# Patient Record
Sex: FEMALE | Race: WHITE | NOT HISPANIC OR LATINO | Employment: FULL TIME | URBAN - METROPOLITAN AREA
[De-identification: names, ages, dates, MRNs, and addresses within clinical notes are randomized per-mention and may not be internally consistent; named-entity substitution may affect disease eponyms.]

---

## 2017-02-27 ENCOUNTER — APPOINTMENT (OUTPATIENT)
Dept: MAMMOGRAPHY | Facility: CLINIC | Age: 47
End: 2017-02-27

## 2017-02-27 ENCOUNTER — OFFICE VISIT (OUTPATIENT)
Dept: OBSTETRICS AND GYNECOLOGY | Facility: CLINIC | Age: 47
End: 2017-02-27

## 2017-02-27 VITALS
WEIGHT: 166.6 LBS | BODY MASS INDEX: 28.44 KG/M2 | DIASTOLIC BLOOD PRESSURE: 66 MMHG | HEIGHT: 64 IN | SYSTOLIC BLOOD PRESSURE: 108 MMHG

## 2017-02-27 DIAGNOSIS — Z01.419 ENCOUNTER FOR GYNECOLOGICAL EXAMINATION WITHOUT ABNORMAL FINDING: Primary | ICD-10-CM

## 2017-02-27 DIAGNOSIS — Z12.31 VISIT FOR SCREENING MAMMOGRAM: ICD-10-CM

## 2017-02-27 PROCEDURE — 99396 PREV VISIT EST AGE 40-64: CPT | Performed by: OBSTETRICS & GYNECOLOGY

## 2017-02-27 PROCEDURE — 77067 SCR MAMMO BI INCL CAD: CPT | Performed by: OBSTETRICS & GYNECOLOGY

## 2017-02-27 NOTE — PROGRESS NOTES
Subjective   CC AE    Chief Complaint   Patient presents with   • Gynecologic Exam      History of Present Illness    Inez Olivares is a 47 y.o. female who presents for annual exam.  Patient had regular periods and just lost her  6 months ago.  Mammogram today.  Negative family history.    Obstetric History:  OB History      Para Term  AB TAB SAB Ectopic Multiple Living    3 3 3                Menstrual History:     No LMP recorded.       Past Medical History   Diagnosis Date   • Asthma      ALLERGY RELATED   • PONV (postoperative nausea and vomiting)      Family History   Problem Relation Age of Onset   • Uterine cancer Mother        The following portions of the patient's history were reviewed and updated as appropriate: allergies, current medications, past family history, past medical history, past social history, past surgical history and problem list.    Review of Systems   Constitutional: Negative.  Negative for fever and unexpected weight change.   HENT: Negative.    Respiratory: Negative for shortness of breath and wheezing.    Cardiovascular: Negative for chest pain, palpitations and leg swelling.   Gastrointestinal: Negative for abdominal pain, anal bleeding and blood in stool.   Genitourinary: Negative for dysuria, pelvic pain, urgency, vaginal bleeding, vaginal discharge and vaginal pain.   Skin: Negative.    Neurological: Negative.    Hematological: Negative.  Negative for adenopathy.   Psychiatric/Behavioral: Negative.  Negative for dysphoric mood. The patient is not nervous/anxious.             Objective   Physical Exam   Constitutional: She is oriented to person, place, and time. Vital signs are normal. She appears well-developed and well-nourished.   HENT:   Head: Normocephalic.   Neck: Trachea normal. No tracheal deviation present. No thyromegaly present.   Cardiovascular: Normal rate, regular rhythm and normal heart sounds.    No murmur heard.  Pulmonary/Chest: Effort normal  "and breath sounds normal.   Breasts without masses, tenderness or nipple discharge   Abdominal: Soft. Normal appearance. She exhibits no mass. There is no hepatosplenomegaly. There is no tenderness. No hernia.   Genitourinary: Rectum normal, vagina normal and uterus normal. Uterus is not enlarged and not tender. Cervix exhibits no motion tenderness. Right adnexum displays no mass and no tenderness. Left adnexum displays no mass and no tenderness. No vaginal discharge found.   Genitourinary Comments: External genitalia normal    Lymphadenopathy:     She has no cervical adenopathy.     She has no axillary adenopathy.   Neurological: She is alert and oriented to person, place, and time.   Skin: Skin is warm and dry. No rash noted.   Psychiatric: She has a normal mood and affect. Her behavior is normal. Cognition and memory are normal.       Visit Vitals   • /66   • Ht 64\" (162.6 cm)   • Wt 166 lb 9.6 oz (75.6 kg)   • BMI 28.6 kg/m2       Assessment/Plan   Inez was seen today for gynecologic exam.    Diagnoses and all orders for this visit:    Encounter for gynecological examination without abnormal finding  -     IGP,rfx Aptima HPV All Pth      Mammogram. RTO 1 year             "

## 2017-03-01 LAB
CONV .: NORMAL
CYTOLOGIST CVX/VAG CYTO: NORMAL
CYTOLOGY CVX/VAG DOC THIN PREP: NORMAL
DX ICD CODE: NORMAL
HIV 1 & 2 AB SER-IMP: NORMAL
OTHER STN SPEC: NORMAL
PATH REPORT.FINAL DX SPEC: NORMAL
STAT OF ADQ CVX/VAG CYTO-IMP: NORMAL

## 2017-03-27 ENCOUNTER — OFFICE VISIT (OUTPATIENT)
Dept: RETAIL CLINIC | Facility: CLINIC | Age: 47
End: 2017-03-27

## 2017-03-27 VITALS — TEMPERATURE: 98.3 F | HEART RATE: 75 BPM | OXYGEN SATURATION: 98 %

## 2017-03-27 DIAGNOSIS — R30.0 DYSURIA: Primary | ICD-10-CM

## 2017-03-27 DIAGNOSIS — N30.00 ACUTE CYSTITIS WITHOUT HEMATURIA: ICD-10-CM

## 2017-03-27 LAB
BILIRUB BLD-MCNC: NEGATIVE MG/DL
CLARITY, POC: CLEAR
COLOR UR: ABNORMAL
GLUCOSE UR STRIP-MCNC: NEGATIVE MG/DL
KETONES UR QL: NEGATIVE
LEUKOCYTE EST, POC: ABNORMAL
NITRITE UR-MCNC: NEGATIVE MG/ML
PH UR: 6.5 [PH] (ref 5–8)
PROT UR STRIP-MCNC: NEGATIVE MG/DL
RBC # UR STRIP: ABNORMAL /UL
SP GR UR: 1.01 (ref 1–1.03)
UROBILINOGEN UR QL: NORMAL

## 2017-03-27 PROCEDURE — 81002 URINALYSIS NONAUTO W/O SCOPE: CPT | Performed by: NURSE PRACTITIONER

## 2017-03-27 PROCEDURE — 99213 OFFICE O/P EST LOW 20 MIN: CPT | Performed by: NURSE PRACTITIONER

## 2017-03-27 RX ORDER — CIPROFLOXACIN 500 MG/1
500 TABLET, FILM COATED ORAL DAILY
Qty: 3 TABLET | Refills: 0 | Status: SHIPPED | OUTPATIENT
Start: 2017-03-27 | End: 2018-03-05

## 2017-03-27 NOTE — PROGRESS NOTES
Subjective   Patient ID: Inez Olivares is a 47 y.o. female presents with   Chief Complaint   Patient presents with   • Urinary Tract Infection     1 week       HPI Comments: 48 yo wf  PMH: S/P tubal ligation 3/2016  Cc: urgency, frequency and burning upon urination x 1 week.  She has taken nothing for relief.     Urinary Tract Infection    Associated symptoms include frequency and urgency. Pertinent negatives include no chills, flank pain, hematuria, nausea or vomiting.       No Known Allergies    The following portions of the patient's history were reviewed and updated as appropriate: allergies, current medications, past family history, past medical history, past social history, past surgical history and problem list.      Review of Systems   Constitutional: Negative for appetite change, chills, fever and unexpected weight change.   HENT: Negative.    Eyes: Negative.    Respiratory: Negative for chest tightness, shortness of breath and wheezing.    Cardiovascular: Negative for chest pain and palpitations.   Gastrointestinal: Negative for abdominal distention, abdominal pain, blood in stool, diarrhea, nausea and vomiting.   Endocrine: Negative.    Genitourinary: Positive for dysuria, frequency and urgency. Negative for difficulty urinating, enuresis, flank pain, genital sores, hematuria, menstrual problem, pelvic pain, vaginal bleeding, vaginal discharge and vaginal pain.   Musculoskeletal: Negative for arthralgias and joint swelling.   Skin: Negative for color change and rash.   Allergic/Immunologic: Negative.    Neurological: Negative for syncope, weakness and light-headedness.   Hematological: Negative for adenopathy. Does not bruise/bleed easily.   Psychiatric/Behavioral: Negative for confusion and sleep disturbance.       Objective     Vitals:    03/27/17 1619   Pulse: 75   Temp: 98.3 °F (36.8 °C)   SpO2: 98%         Physical Exam   Constitutional: She is oriented to person, place, and time. She appears  well-developed and well-nourished. No distress.   HENT:   Head: Normocephalic and atraumatic.   Eyes: Conjunctivae and EOM are normal.   Neck: Neck supple.   Cardiovascular: Normal rate, regular rhythm and normal heart sounds.    Pulmonary/Chest: Effort normal and breath sounds normal.   Abdominal: Soft. Bowel sounds are normal. She exhibits no distension and no mass. There is no tenderness. There is no rebound and no guarding.   Musculoskeletal: Normal range of motion.   Neurological: She is alert and oriented to person, place, and time. She has normal reflexes.   Skin: Skin is warm and dry. No rash noted. She is not diaphoretic.   Psychiatric: She has a normal mood and affect. Her behavior is normal. Judgment and thought content normal.   Nursing note and vitals reviewed.    Results for orders placed or performed in visit on 03/27/17   POC Urinalysis Dipstick   Result Value Ref Range    Color Straw Yellow, Straw, Dark Yellow, Radha    Clarity, UA Clear Clear    Glucose, UA Negative Negative, 1000 mg/dL (3+) mg/dL    Bilirubin Negative Negative    Ketones, UA Negative Negative    Specific Gravity  1.010 1.005 - 1.030    Blood, UA Small (A) Negative    pH, Urine 6.5 5.0 - 8.0    Protein, POC Negative Negative mg/dL    Urobilinogen, UA Normal Normal    Leukocytes Small (1+) (A) Negative    Nitrite, UA Negative Negative           Inez was seen today for urinary tract infection.    Diagnoses and all orders for this visit:    Dysuria  -     POC Urinalysis Dipstick  -     Urine Culture    Acute cystitis without hematuria  -     ciprofloxacin (CIPRO) 500 MG tablet; Take 1 tablet by mouth Daily.    Increase fluids, rest, activity as tolerated, Tylenol or Advil OTC as needed for pain  I will inform patient of pending results as soon as they are available.      Follow-up with Primary Care Physician in 24-48 hours if these symptoms worsen or fail to improve as anticipated.

## 2017-03-31 ENCOUNTER — DOCUMENTATION (OUTPATIENT)
Dept: RETAIL CLINIC | Facility: CLINIC | Age: 47
End: 2017-03-31

## 2017-03-31 DIAGNOSIS — N30.00 ACUTE CYSTITIS WITHOUT HEMATURIA: Primary | ICD-10-CM

## 2017-03-31 LAB
BACTERIA UR CULT: ABNORMAL
BACTERIA UR CULT: ABNORMAL
Lab: NORMAL
ONE SPECIMEN IDENTIFIER: NORMAL
OTHER ANTIBIOTIC SUSC ISLT: ABNORMAL

## 2017-03-31 RX ORDER — NITROFURANTOIN 25; 75 MG/1; MG/1
100 CAPSULE ORAL 2 TIMES DAILY
Qty: 20 CAPSULE | Refills: 0 | Status: SHIPPED | OUTPATIENT
Start: 2017-03-31 | End: 2018-03-05

## 2017-03-31 NOTE — PROGRESS NOTES
Urine culture results discussed with patient. +Ecoli. Resistant to cipro. Will send rx macrobid 100mg po bid x 7d. Pt reports symptomatic improvement.

## 2017-10-25 ENCOUNTER — TRANSCRIBE ORDERS (OUTPATIENT)
Dept: PHYSICAL THERAPY | Facility: HOSPITAL | Age: 47
End: 2017-10-25

## 2017-10-25 DIAGNOSIS — M25.551 PAIN OF RIGHT HIP JOINT: Primary | ICD-10-CM

## 2017-11-02 ENCOUNTER — APPOINTMENT (OUTPATIENT)
Dept: PHYSICAL THERAPY | Facility: HOSPITAL | Age: 47
End: 2017-11-02

## 2018-03-05 ENCOUNTER — TRANSCRIBE ORDERS (OUTPATIENT)
Dept: OBSTETRICS AND GYNECOLOGY | Facility: CLINIC | Age: 48
End: 2018-03-05

## 2018-03-05 ENCOUNTER — OFFICE VISIT (OUTPATIENT)
Dept: OBSTETRICS AND GYNECOLOGY | Facility: CLINIC | Age: 48
End: 2018-03-05

## 2018-03-05 VITALS
HEIGHT: 64 IN | DIASTOLIC BLOOD PRESSURE: 68 MMHG | WEIGHT: 170.2 LBS | BODY MASS INDEX: 29.06 KG/M2 | SYSTOLIC BLOOD PRESSURE: 120 MMHG

## 2018-03-05 DIAGNOSIS — Z01.419 ENCOUNTER FOR GYNECOLOGICAL EXAMINATION WITHOUT ABNORMAL FINDING: Primary | ICD-10-CM

## 2018-03-05 DIAGNOSIS — Z12.31 VISIT FOR SCREENING MAMMOGRAM: Primary | ICD-10-CM

## 2018-03-05 DIAGNOSIS — R39.9 LOWER URINARY TRACT SYMPTOMS: ICD-10-CM

## 2018-03-05 PROCEDURE — 99396 PREV VISIT EST AGE 40-64: CPT | Performed by: OBSTETRICS & GYNECOLOGY

## 2018-03-05 NOTE — PROGRESS NOTES
Subjective    Chief Complaint   Patient presents with   • Gynecologic Exam   • Vaginitis   • Urinary Tract Infection      History of Present Illness    Inez Olivares is a 48 y.o. female who presents for annual exam. Mammogram is being rescheduled.  Nonsmoker.  No problems.  Patient lost her  little over a year ago.    Obstetric History:  OB History      Para Term  AB Living    3 3 3       SAB TAB Ectopic Multiple Live Births                 Menstrual History:     No LMP recorded.       Past Medical History:   Diagnosis Date   • Asthma     ALLERGY RELATED   • PONV (postoperative nausea and vomiting)      Family History   Problem Relation Age of Onset   • Uterine cancer Mother        The following portions of the patient's history were reviewed and updated as appropriate: allergies, current medications, past family history, past medical history, past social history, past surgical history and problem list.    Review of Systems   Constitutional: Negative.  Negative for fever and unexpected weight change.   HENT: Negative.    Respiratory: Negative for shortness of breath and wheezing.    Cardiovascular: Negative for chest pain, palpitations and leg swelling.   Gastrointestinal: Negative for abdominal pain, anal bleeding and blood in stool.   Genitourinary: Positive for dysuria. Negative for pelvic pain, urgency, vaginal bleeding, vaginal discharge and vaginal pain.   Skin: Negative.    Neurological: Negative.    Hematological: Negative.  Negative for adenopathy.   Psychiatric/Behavioral: Negative.  Negative for dysphoric mood. The patient is not nervous/anxious.             Objective   Physical Exam   Constitutional: She is oriented to person, place, and time. Vital signs are normal. She appears well-developed and well-nourished.   HENT:   Head: Normocephalic.   Neck: Trachea normal. No tracheal deviation present. No thyromegaly present.   Cardiovascular: Normal rate, regular rhythm and normal  "heart sounds.    No murmur heard.  Pulmonary/Chest: Effort normal and breath sounds normal.   Breasts without masses, tenderness or nipple discharge   Abdominal: Soft. Normal appearance. She exhibits no mass. There is no hepatosplenomegaly. There is no tenderness. No hernia.   Genitourinary: Rectum normal, vagina normal and uterus normal. Uterus is not enlarged and not tender. Cervix exhibits no motion tenderness. Right adnexum displays no mass and no tenderness. Left adnexum displays no mass and no tenderness. No vaginal discharge found.   Genitourinary Comments: External genitalia normal    Lymphadenopathy:     She has no cervical adenopathy.     She has no axillary adenopathy.   Neurological: She is alert and oriented to person, place, and time.   Skin: Skin is warm and dry. No rash noted.   Psychiatric: She has a normal mood and affect. Her behavior is normal. Cognition and memory are normal.       /68  Ht 162.6 cm (64\")  Wt 77.2 kg (170 lb 3.2 oz)  BMI 29.21 kg/m2    Assessment/Plan   Inez was seen today for gynecologic exam, vaginitis and urinary tract infection.    Diagnoses and all orders for this visit:    Encounter for gynecological examination without abnormal finding  -     IGP,rfx Aptima HPV All Pth    Lower urinary tract symptoms  -     Urine Culture - Urine, Urine, Clean Catch  -     Urinalysis With Microscopic - Urine, Clean Catch      Mammogram. RTO 1 year     Counseled about beginning calcium with vitamin D.  Also discussed exercise and weight loss due to dieting.         "

## 2018-03-07 ENCOUNTER — TELEPHONE (OUTPATIENT)
Dept: OBSTETRICS AND GYNECOLOGY | Facility: CLINIC | Age: 48
End: 2018-03-07

## 2018-03-07 LAB
APPEARANCE UR: CLEAR
BACTERIA #/AREA URNS HPF: NORMAL /HPF
BACTERIA UR CULT: NORMAL
BACTERIA UR CULT: NORMAL
BILIRUB UR QL STRIP: NEGATIVE
CASTS URNS MICRO: NORMAL
COLOR UR: YELLOW
EPI CELLS #/AREA URNS HPF: NORMAL /HPF
GLUCOSE UR QL: NEGATIVE
HGB UR QL STRIP: NEGATIVE
KETONES UR QL STRIP: NEGATIVE
LEUKOCYTE ESTERASE UR QL STRIP: NEGATIVE
NITRITE UR QL STRIP: NEGATIVE
PH UR STRIP: 6 [PH] (ref 5–8)
PROT UR QL STRIP: NEGATIVE
RBC #/AREA URNS HPF: NORMAL /HPF
SP GR UR: 1.02 (ref 1–1.03)
UROBILINOGEN UR STRIP-MCNC: (no result) MG/DL
WBC #/AREA URNS HPF: NORMAL /HPF

## 2018-03-07 NOTE — TELEPHONE ENCOUNTER
Discussed normal urine culture results.  Also referred patient to Dr. Nehemiah Cleaning dermatologist at [facility].  MACKENZIE

## 2018-03-09 LAB
CONV .: ABNORMAL
CYTOLOGIST CVX/VAG CYTO: ABNORMAL
CYTOLOGY CVX/VAG DOC THIN PREP: ABNORMAL
DX ICD CODE: ABNORMAL
DX ICD CODE: ABNORMAL
HIV 1 & 2 AB SER-IMP: ABNORMAL
HPV I/H RISK 4 DNA CVX QL PROBE+SIG AMP: NEGATIVE
OTHER STN SPEC: ABNORMAL
PATH REPORT.FINAL DX SPEC: ABNORMAL
PATHOLOGIST CVX/VAG CYTO: ABNORMAL
STAT OF ADQ CVX/VAG CYTO-IMP: ABNORMAL

## 2018-03-12 DIAGNOSIS — L98.9 SKIN LESION: Primary | ICD-10-CM

## 2018-03-28 ENCOUNTER — OFFICE VISIT (OUTPATIENT)
Dept: RETAIL CLINIC | Facility: CLINIC | Age: 48
End: 2018-03-28

## 2018-03-28 VITALS
RESPIRATION RATE: 18 BRPM | DIASTOLIC BLOOD PRESSURE: 78 MMHG | SYSTOLIC BLOOD PRESSURE: 122 MMHG | OXYGEN SATURATION: 99 % | TEMPERATURE: 98.5 F | HEART RATE: 82 BPM

## 2018-03-28 DIAGNOSIS — J06.9 ACUTE URI: ICD-10-CM

## 2018-03-28 DIAGNOSIS — N39.0 URINARY TRACT INFECTION WITH HEMATURIA, SITE UNSPECIFIED: Primary | ICD-10-CM

## 2018-03-28 DIAGNOSIS — R31.9 URINARY TRACT INFECTION WITH HEMATURIA, SITE UNSPECIFIED: Primary | ICD-10-CM

## 2018-03-28 LAB
BILIRUB BLD-MCNC: NEGATIVE MG/DL
CLARITY, POC: CLEAR
COLOR UR: YELLOW
GLUCOSE UR STRIP-MCNC: NEGATIVE MG/DL
KETONES UR QL: NEGATIVE
LEUKOCYTE EST, POC: ABNORMAL
NITRITE UR-MCNC: NEGATIVE MG/ML
PH UR: 7 [PH] (ref 5–8)
PROT UR STRIP-MCNC: NEGATIVE MG/DL
RBC # UR STRIP: ABNORMAL /UL
SP GR UR: 1.01 (ref 1–1.03)
UROBILINOGEN UR QL: ABNORMAL

## 2018-03-28 PROCEDURE — 99213 OFFICE O/P EST LOW 20 MIN: CPT | Performed by: NURSE PRACTITIONER

## 2018-03-28 PROCEDURE — 81003 URINALYSIS AUTO W/O SCOPE: CPT | Performed by: NURSE PRACTITIONER

## 2018-03-28 RX ORDER — AMOXICILLIN 500 MG/1
500 CAPSULE ORAL 2 TIMES DAILY
Qty: 20 CAPSULE | Refills: 0 | Status: SHIPPED | OUTPATIENT
Start: 2018-03-28 | End: 2018-04-07

## 2018-03-28 RX ORDER — PREDNISONE 20 MG/1
20 TABLET ORAL 2 TIMES DAILY
Qty: 14 TABLET | Refills: 0 | Status: SHIPPED | OUTPATIENT
Start: 2018-03-28 | End: 2018-03-28

## 2018-03-28 RX ORDER — BROMPHENIRAMINE MALEATE, PSEUDOEPHEDRINE HYDROCHLORIDE, AND DEXTROMETHORPHAN HYDROBROMIDE 2; 30; 10 MG/5ML; MG/5ML; MG/5ML
SYRUP ORAL
Qty: 240 ML | Refills: 0 | Status: SHIPPED | OUTPATIENT
Start: 2018-03-28 | End: 2018-11-20

## 2018-03-28 RX ORDER — AMOXICILLIN 500 MG/1
500 CAPSULE ORAL 2 TIMES DAILY
Qty: 20 CAPSULE | Refills: 0 | Status: SHIPPED | OUTPATIENT
Start: 2018-03-28 | End: 2018-03-28

## 2018-03-28 RX ORDER — PREDNISONE 20 MG/1
20 TABLET ORAL 2 TIMES DAILY
Qty: 14 TABLET | Refills: 0 | Status: SHIPPED | OUTPATIENT
Start: 2018-03-28 | End: 2018-04-04

## 2018-03-28 RX ORDER — BROMPHENIRAMINE MALEATE, PSEUDOEPHEDRINE HYDROCHLORIDE, AND DEXTROMETHORPHAN HYDROBROMIDE 2; 30; 10 MG/5ML; MG/5ML; MG/5ML
SYRUP ORAL
Qty: 240 ML | Refills: 0 | Status: SHIPPED | OUTPATIENT
Start: 2018-03-28 | End: 2018-03-28

## 2018-03-28 NOTE — PATIENT INSTRUCTIONS
Sinusitis, Adult  Sinusitis is soreness and inflammation of your sinuses. Sinuses are hollow spaces in the bones around your face. Your sinuses are located:  · Around your eyes.  · In the middle of your forehead.  · Behind your nose.  · In your cheekbones.  Your sinuses and nasal passages are lined with a stringy fluid (mucus). Mucus normally drains out of your sinuses. When your nasal tissues become inflamed or swollen, the mucus can become trapped or blocked so air cannot flow through your sinuses. This allows bacteria, viruses, and funguses to grow, which leads to infection.  Sinusitis can develop quickly and last for 7?10 days (acute) or for more than 12 weeks (chronic). Sinusitis often develops after a cold.  What are the causes?  This condition is caused by anything that creates swelling in the sinuses or stops mucus from draining, including:  · Allergies.  · Asthma.  · Bacterial or viral infection.  · Abnormally shaped bones between the nasal passages.  · Nasal growths that contain mucus (nasal polyps).  · Narrow sinus openings.  · Pollutants, such as chemicals or irritants in the air.  · A foreign object stuck in the nose.  · A fungal infection. This is rare.  What increases the risk?  The following factors may make you more likely to develop this condition:  · Having allergies or asthma.  · Having had a recent cold or respiratory tract infection.  · Having structural deformities or blockages in your nose or sinuses.  · Having a weak immune system.  · Doing a lot of swimming or diving.  · Overusing nasal sprays.  · Smoking.  What are the signs or symptoms?  The main symptoms of this condition are pain and a feeling of pressure around the affected sinuses. Other symptoms include:  · Upper toothache.  · Earache.  · Headache.  · Bad breath.  · Decreased sense of smell and taste.  · A cough that may get worse at night.  · Fatigue.  · Fever.  · Thick drainage from your nose. The drainage is often green and it may  contain pus (purulent).  · Stuffy nose or congestion.  · Postnasal drip. This is when extra mucus collects in the throat or back of the nose.  · Swelling and warmth over the affected sinuses.  · Sore throat.  · Sensitivity to light.  How is this diagnosed?  This condition is diagnosed based on symptoms, a medical history, and a physical exam. To find out if your condition is acute or chronic, your health care provider may:  · Look in your nose for signs of nasal polyps.  · Tap over the affected sinus to check for signs of infection.  · View the inside of your sinuses using an imaging device that has a light attached (endoscope).  If your health care provider suspects that you have chronic sinusitis, you may also:  · Be tested for allergies.  · Have a sample of mucus taken from your nose (nasal culture) and checked for bacteria.  · Have a mucus sample examined to see if your sinusitis is related to an allergy.  If your sinusitis does not respond to treatment and it lasts longer than 8 weeks, you may have an MRI or CT scan to check your sinuses. These scans also help to determine how severe your infection is.  In rare cases, a bone biopsy may be done to rule out more serious types of fungal sinus disease.  How is this treated?  Treatment for sinusitis depends on the cause and whether your condition is chronic or acute. If a virus is causing your sinusitis, your symptoms will go away on their own within 10 days. You may be given medicines to relieve your symptoms, including:  · Topical nasal decongestants. They shrink swollen nasal passages and let mucus drain from your sinuses.  · Antihistamines. These drugs block inflammation that is triggered by allergies. This can help to ease swelling in your nose and sinuses.  · Topical nasal corticosteroids. These are nasal sprays that ease inflammation and swelling in your nose and sinuses.  · Nasal saline washes. These rinses can help to get rid of thick mucus in your  nose.  If your condition is caused by bacteria, you will be given an antibiotic medicine. If your condition is caused by a fungus, you will be given an antifungal medicine.  Surgery may be needed to correct underlying conditions, such as narrow nasal passages. Surgery may also be needed to remove polyps.  Follow these instructions at home:  Medicines   · Take, use, or apply over-the-counter and prescription medicines only as told by your health care provider. These may include nasal sprays.  · If you were prescribed an antibiotic medicine, take it as told by your health care provider. Do not stop taking the antibiotic even if you start to feel better.  Hydrate and Humidify   · Drink enough water to keep your urine clear or pale yellow. Staying hydrated will help to thin your mucus.  · Use a cool mist humidifier to keep the humidity level in your home above 50%.  · Inhale steam for 10-15 minutes, 3-4 times a day or as told by your health care provider. You can do this in the bathroom while a hot shower is running.  · Limit your exposure to cool or dry air.  Rest   · Rest as much as possible.  · Sleep with your head raised (elevated).  · Make sure to get enough sleep each night.  General instructions   · Apply a warm, moist washcloth to your face 3-4 times a day or as told by your health care provider. This will help with discomfort.  · Wash your hands often with soap and water to reduce your exposure to viruses and other germs. If soap and water are not available, use hand .  · Do not smoke. Avoid being around people who are smoking (secondhand smoke).  · Keep all follow-up visits as told by your health care provider. This is important.  Contact a health care provider if:  · You have a fever.  · Your symptoms get worse.  · Your symptoms do not improve within 10 days.  Get help right away if:  · You have a severe headache.  · You have persistent vomiting.  · You have pain or swelling around your face or  eyes.  · You have vision problems.  · You develop confusion.  · Your neck is stiff.  · You have trouble breathing.  This information is not intended to replace advice given to you by your health care provider. Make sure you discuss any questions you have with your health care provider.  Document Released: 12/18/2006 Document Revised: 08/13/2017 Document Reviewed: 10/12/2016  [employer] Interactive Patient Education © 2017 [employer]  Urinary Tract Infection, Adult  A urinary tract infection (UTI) is an infection of any part of the urinary tract, which includes the kidneys, ureters, bladder, and urethra. These organs make, store, and get rid of urine in the body. UTI can be a bladder infection (cystitis) or kidney infection (pyelonephritis).  What are the causes?  This infection may be caused by fungi, viruses, or bacteria. Bacteria are the most common cause of UTIs. This condition can also be caused by repeated incomplete emptying of the bladder during urination.  What increases the risk?  This condition is more likely to develop if:  · You ignore your need to urinate or hold urine for long periods of time.  · You do not empty your bladder completely during urination.  · You wipe back to front after urinating or having a bowel movement, if you are female.  · You are uncircumcised, if you are male.  · You are constipated.  · You have a urinary catheter that stays in place (indwelling).  · You have a weak defense (immune) system.  · You have a medical condition that affects your bowels, kidneys, or bladder.  · You have diabetes.  · You take antibiotic medicines frequently or for long periods of time, and the antibiotics no longer work well against certain types of infections (antibiotic resistance).  · You take medicines that irritate your urinary tract.  · You are exposed to chemicals that irritate your urinary tract.  · You are female.  What are the signs or symptoms?  Symptoms of this condition  include:  · Fever.  · Frequent urination or passing small amounts of urine frequently.  · Needing to urinate urgently.  · Pain or burning with urination.  · Urine that smells bad or unusual.  · Cloudy urine.  · Pain in the lower abdomen or back.  · Trouble urinating.  · Blood in the urine.  · Vomiting or being less hungry than normal.  · Diarrhea or abdominal pain.  · Vaginal discharge, if you are female.  How is this diagnosed?  This condition is diagnosed with a medical history and physical exam. You will also need to provide a urine sample to test your urine. Other tests may be done, including:  · Blood tests.  · Sexually transmitted disease (STD) testing.  If you have had more than one UTI, a cystoscopy or imaging studies may be done to determine the cause of the infections.  How is this treated?  Treatment for this condition often includes a combination of two or more of the following:  · Antibiotic medicine.  · Other medicines to treat less common causes of UTI.  · Over-the-counter medicines to treat pain.  · Drinking enough water to stay hydrated.  Follow these instructions at home:  · Take over-the-counter and prescription medicines only as told by your health care provider.  · If you were prescribed an antibiotic, take it as told by your health care provider. Do not stop taking the antibiotic even if you start to feel better.  · Avoid alcohol, caffeine, tea, and carbonated beverages. They can irritate your bladder.  · Drink enough fluid to keep your urine clear or pale yellow.  · Keep all follow-up visits as told by your health care provider. This is important.  · Make sure to:  ¨ Empty your bladder often and completely. Do not hold urine for long periods of time.  ¨ Empty your bladder before and after sex.  ¨ Wipe from front to back after a bowel movement if you are female. Use each tissue one time when you wipe.  Contact a health care provider if:  · You have back pain.  · You have a fever.  · You feel  nauseous or vomit.  · Your symptoms do not get better after 3 days.  · Your symptoms go away and then return.  Get help right away if:  · You have severe back pain or lower abdominal pain.  · You are vomiting and cannot keep down any medicines or water.  This information is not intended to replace advice given to you by your health care provider. Make sure you discuss any questions you have with your health care provider.  Document Released: 09/27/2006 Document Revised: 05/31/2017 Document Reviewed: 11/07/2016  [employer] Interactive Patient Education © 2017 [employer]

## 2018-03-28 NOTE — PROGRESS NOTES
Subjective:     Inez Olivares is a 48 y.o.     Urinary Tract Infection    This is a new problem. The current episode started yesterday. The quality of the pain is described as burning. There has been no fever. Associated symptoms include frequency, hematuria, nausea and urgency. Pertinent negatives include no flank pain or vomiting. She has tried nothing for the symptoms. Her past medical history is significant for recurrent UTIs.   Sinusitis   This is a new problem. The current episode started in the past 7 days. The problem has been gradually worsening since onset. There has been no fever. Associated symptoms include congestion (yellow), coughing, headaches and sinus pressure. Pertinent negatives include no ear pain, shortness of breath or sore throat. Treatments tried: benadryl. The treatment provided no relief.         The following portions of the patient's history were reviewed and updated as appropriate: allergies, current medications, past family history, past medical history, past social history, past surgical history and problem list.      Review of Systems   Constitutional: Positive for fatigue. Negative for appetite change and fever.   HENT: Positive for congestion (yellow) and sinus pressure. Negative for ear pain and sore throat.    Respiratory: Positive for cough. Negative for shortness of breath and wheezing.    Cardiovascular: Negative.    Gastrointestinal: Positive for nausea. Negative for diarrhea and vomiting.   Genitourinary: Positive for frequency, hematuria and urgency. Negative for flank pain.   Musculoskeletal: Negative for myalgias.   Skin: Negative for color change, pallor and rash.   Neurological: Positive for headaches. Negative for dizziness.         Objective:      Physical Exam   Constitutional: She is oriented to person, place, and time. She appears well-developed and well-nourished. She is cooperative.   HENT:   Head: Normocephalic and atraumatic.   Right Ear: External ear and ear  canal normal. A middle ear effusion (mild serous effusion) is present.   Left Ear: External ear and ear canal normal. A middle ear effusion (mild serous effusion) is present.   Nose: Nose normal.   Mouth/Throat: No oropharyngeal exudate or posterior oropharyngeal erythema.   Mild purulent nasal congestion   Eyes: Conjunctivae, EOM and lids are normal. Pupils are equal, round, and reactive to light.   Neck: Normal range of motion. Neck supple.   Cardiovascular: Normal rate, regular rhythm, S1 normal, S2 normal and normal heart sounds.    Pulmonary/Chest: Effort normal and breath sounds normal.   Abdominal: Soft. Normal appearance and bowel sounds are normal. There is tenderness in the suprapubic area. There is no CVA tenderness.   Musculoskeletal: Normal range of motion.   Lymphadenopathy:     She has no cervical adenopathy.   Neurological: She is alert and oriented to person, place, and time.   Skin: Skin is warm, dry and intact.   Psychiatric: She has a normal mood and affect. Her speech is normal and behavior is normal. Thought content normal.   Vitals reviewed.          Inez was seen today for urinary tract infection and sinusitis.    Diagnoses and all orders for this visit:    Urinary tract infection with hematuria, site unspecified    Acute URI    Other orders  -     amoxicillin (AMOXIL) 500 MG capsule; Take 1 capsule by mouth 2 (Two) Times a Day for 10 days.  -     predniSONE (DELTASONE) 20 MG tablet; Take 1 tablet by mouth 2 (Two) Times a Day for 7 days.  -     brompheniramine-pseudoephedrine-DM (BROMFED DM) 30-2-10 MG/5ML syrup; 5 to 10 cc every 4 hours as needed for cough, congestion, allergies

## 2018-04-05 LAB
BACTERIA UR CULT: ABNORMAL
BACTERIA UR CULT: ABNORMAL
OTHER ANTIBIOTIC SUSC ISLT: ABNORMAL

## 2018-04-07 ENCOUNTER — DOCUMENTATION (OUTPATIENT)
Dept: RETAIL CLINIC | Facility: CLINIC | Age: 48
End: 2018-04-07

## 2018-05-03 ENCOUNTER — HOSPITAL ENCOUNTER (OUTPATIENT)
Dept: MAMMOGRAPHY | Facility: HOSPITAL | Age: 48
Discharge: HOME | End: 2018-05-03
Attending: OBSTETRICS & GYNECOLOGY | Admitting: OBSTETRICS & GYNECOLOGY

## 2018-05-03 DIAGNOSIS — Z12.31 VISIT FOR SCREENING MAMMOGRAM: ICD-10-CM

## 2018-05-03 PROCEDURE — 77067 SCR MAMMO BI INCL CAD: CPT

## 2018-10-23 ENCOUNTER — OFFICE VISIT (OUTPATIENT)
Dept: RETAIL CLINIC | Facility: CLINIC | Age: 48
End: 2018-10-23

## 2018-10-23 DIAGNOSIS — Z23 NEEDS FLU SHOT: Primary | ICD-10-CM

## 2018-11-01 ENCOUNTER — TELEPHONE (OUTPATIENT)
Dept: OBSTETRICS AND GYNECOLOGY | Facility: CLINIC | Age: 48
End: 2018-11-01

## 2018-11-20 ENCOUNTER — OFFICE VISIT (OUTPATIENT)
Dept: FAMILY MEDICINE CLINIC | Facility: CLINIC | Age: 48
End: 2018-11-20

## 2018-11-20 VITALS
DIASTOLIC BLOOD PRESSURE: 68 MMHG | OXYGEN SATURATION: 99 % | HEART RATE: 73 BPM | SYSTOLIC BLOOD PRESSURE: 103 MMHG | BODY MASS INDEX: 29.19 KG/M2 | TEMPERATURE: 98.2 F | WEIGHT: 171 LBS | HEIGHT: 64 IN

## 2018-11-20 DIAGNOSIS — Z00.00 WELLNESS EXAMINATION: Primary | ICD-10-CM

## 2018-11-20 PROCEDURE — 99386 PREV VISIT NEW AGE 40-64: CPT | Performed by: NURSE PRACTITIONER

## 2018-11-20 RX ORDER — IBUPROFEN 200 MG
200 TABLET ORAL EVERY 6 HOURS PRN
COMMUNITY

## 2018-11-20 NOTE — PROGRESS NOTES
Subjective   Inez Olivares is a 48 y.o. female.     History of Present Illness   Inez Olivares 48 y.o. female who presents today for a new patient appointment.    she has a history of   Patient Active Problem List   Diagnosis   • Dysuria   • Acute cystitis without hematuria   .  she is here to establish care I reviewed the PFSH recorded today by my MA/LPN staff.   she   She has been feeling well.    Patient takes multivitamin daily and uses OTC motrin prn.  She does not take any prescription medication.     Patient's mother had uterine cancer and her father  at age 56 from       Her last PAP and mammo were 3/2018 and were normal per patient.  She sees Dr. Caballero with GYN.  Patient's last menstrual cycles have been very regular but states her last one was 2 weeks late. LMP was 18.    The following portions of the patient's history were reviewed and updated as appropriate: allergies, current medications, past family history, past medical history, past social history, past surgical history and problem list.    Review of Systems   Constitutional: Negative for unexpected weight change.   Respiratory: Negative for shortness of breath.    Cardiovascular: Negative for chest pain and palpitations.   Psychiatric/Behavioral: Negative for behavioral problems.       Objective   Physical Exam   Constitutional: She is oriented to person, place, and time. She appears well-developed and well-nourished.   Neck: Carotid bruit is not present. No thyromegaly present.   Cardiovascular: Normal rate and regular rhythm.   Pulmonary/Chest: Effort normal and breath sounds normal.   Neurological: She is alert and oriented to person, place, and time.   Psychiatric: She has a normal mood and affect. Judgment normal.   Nursing note and vitals reviewed.      Assessment/Plan   Inez was seen today for establish care.    Diagnoses and all orders for this visit:    Wellness examination  -     Comprehensive metabolic panel  -     Lipid  panel  -     CBC and Differential  -     TSH

## 2018-12-01 ENCOUNTER — OFFICE VISIT (OUTPATIENT)
Dept: RETAIL CLINIC | Facility: CLINIC | Age: 48
End: 2018-12-01

## 2018-12-01 VITALS
OXYGEN SATURATION: 98 % | SYSTOLIC BLOOD PRESSURE: 115 MMHG | DIASTOLIC BLOOD PRESSURE: 65 MMHG | HEART RATE: 79 BPM | TEMPERATURE: 98.4 F

## 2018-12-01 DIAGNOSIS — L08.9 TOE INFECTION: Primary | ICD-10-CM

## 2018-12-01 PROCEDURE — 99213 OFFICE O/P EST LOW 20 MIN: CPT | Performed by: NURSE PRACTITIONER

## 2018-12-01 NOTE — PROGRESS NOTES
Subjective:     Inez Olivares is a 48 y.o.     Toe Pain    The incident occurred 3 to 5 days ago. Incident location: pulled hang nail off of great toe right foot and she is worried now that it migh be infected. Pain location: right great toe. Quality: sore, feels like foot is a little swollen. Pertinent negatives include no inability to bear weight, loss of sensation, numbness or tingling. She reports no foreign bodies present. She has tried nothing for the symptoms.         The following portions of the patient's history were reviewed and updated as appropriate: allergies, current medications, past family history, past medical history, past social history, past surgical history and problem list.      Review of Systems   Respiratory: Negative.    Cardiovascular: Negative.    Skin:        Pulled hang nail off great toe right foot and is worried now that is infected, it sore and she feels the foot is mildly swollen   Neurological: Negative for tingling and numbness.         Objective:      Physical Exam   Cardiovascular: Normal rate, regular rhythm, S1 normal, S2 normal and normal heart sounds.   Pulmonary/Chest: Breath sounds normal.   Skin:   Erythematous region surrounding lateral great toe right foot, no edema noted, wound cleansed with wound cleanser and mupirocin ointment applied. Clean with mild soap and water at home. Leave DOC as much as possible at home to avoid environment for fungal infection.  Apply mupirocin ointment. Wear white socks when out.   Vitals reviewed.          Inez was seen today for toe pain.    Diagnoses and all orders for this visit:    Toe infection    Other orders  -     mupirocin (BACTROBAN) 2 % ointment; Apply  topically to the appropriate area as directed 2 (Two) Times a Day for 7 days.

## 2018-12-01 NOTE — PATIENT INSTRUCTIONS
Ingrown Toenail  An ingrown toenail occurs when the corner or sides of your toenail grow into the surrounding skin. The big toe is most commonly affected, but it can happen to any of your toes. If your ingrown toenail is not treated, you will be at risk for infection.  What are the causes?  This condition may be caused by:  · Wearing shoes that are too small or tight.  · Injury or trauma, such as stubbing your toe or having your toe stepped on.  · Improper cutting or care of your toenails.  · Being born with (congenital) nail or foot abnormalities, such as having a nail that is too big for your toe.    What increases the risk?  Risk factors for an ingrown toenail include:  · Age. Your nails tend to thicken as you get older, so ingrown nails are more common in older people.  · Diabetes.  · Cutting your toenails incorrectly.  · Blood circulation problems.    What are the signs or symptoms?  Symptoms may include:  · Pain, soreness, or tenderness.  · Redness.  · Swelling.  · Hardening of the skin surrounding the toe.    Your ingrown toenail may be infected if there is fluid, pus, or drainage.  How is this diagnosed?  An ingrown toenail may be diagnosed by medical history and physical exam. If your toenail is infected, your health care provider may test a sample of the drainage.  How is this treated?  Treatment depends on the severity of your ingrown toenail. Some ingrown toenails may be treated at home. More severe or infected ingrown toenails may require surgery to remove all or part of the nail. Infected ingrown toenails may also be treated with antibiotic medicines.  Follow these instructions at home:  · If you were prescribed an antibiotic medicine, finish all of it even if you start to feel better.  · Soak your foot in warm soapy water for 20 minutes, 3 times per day or as directed by your health care provider.  · Carefully lift the edge of the nail away from the sore skin by wedging a small piece of cotton under  the corner of the nail. This may help with the pain. Be careful not to cause more injury to the area.  · Wear shoes that fit well. If your ingrown toenail is causing you pain, try wearing sandals, if possible.  · Trim your toenails regularly and carefully. Do not cut them in a curved shape. Cut your toenails straight across. This prevents injury to the skin at the corners of the toenail.  · Keep your feet clean and dry.  · If you are having trouble walking and are given crutches by your health care provider, use them as directed.  · Do not pick at your toenail or try to remove it yourself.  · Take medicines only as directed by your health care provider.  · Keep all follow-up visits as directed by your health care provider. This is important.  Contact a health care provider if:  · Your symptoms do not improve with treatment.  Get help right away if:  · You have red streaks that start at your foot and go up your leg.  · You have a fever.  · You have increased redness, swelling, or pain.  · You have fluid, blood, or pus coming from your toenail.  This information is not intended to replace advice given to you by your health care provider. Make sure you discuss any questions you have with your health care provider.  Document Released: 12/15/2001 Document Revised: 05/19/2017 Document Reviewed: 11/11/2015  [employer] Patient Education © 2018 [employer]

## 2018-12-13 LAB
ALBUMIN SERPL-MCNC: 4.1 G/DL (ref 3.5–5.2)
ALBUMIN/GLOB SERPL: 2 G/DL
ALP SERPL-CCNC: 59 U/L (ref 39–117)
ALT SERPL-CCNC: 20 U/L (ref 1–33)
AST SERPL-CCNC: 19 U/L (ref 1–32)
BASOPHILS # BLD AUTO: 0.04 10*3/MM3 (ref 0–0.2)
BASOPHILS NFR BLD AUTO: 0.7 % (ref 0–1.5)
BILIRUB SERPL-MCNC: 0.3 MG/DL (ref 0.1–1.2)
BUN SERPL-MCNC: 17 MG/DL (ref 6–20)
BUN/CREAT SERPL: 25 (ref 7–25)
CALCIUM SERPL-MCNC: 9.2 MG/DL (ref 8.6–10.5)
CHLORIDE SERPL-SCNC: 101 MMOL/L (ref 98–107)
CHOLEST SERPL-MCNC: 143 MG/DL (ref 0–200)
CO2 SERPL-SCNC: 28 MMOL/L (ref 22–29)
CREAT SERPL-MCNC: 0.68 MG/DL (ref 0.57–1)
EOSINOPHIL # BLD AUTO: 0.13 10*3/MM3 (ref 0–0.7)
EOSINOPHIL NFR BLD AUTO: 2.1 % (ref 0.3–6.2)
ERYTHROCYTE [DISTWIDTH] IN BLOOD BY AUTOMATED COUNT: 14.4 % (ref 11.7–13)
GLOBULIN SER CALC-MCNC: 2.1 GM/DL
GLUCOSE SERPL-MCNC: 89 MG/DL (ref 65–99)
HCT VFR BLD AUTO: 34.7 % (ref 35.6–45.5)
HDLC SERPL-MCNC: 46 MG/DL (ref 40–60)
HGB BLD-MCNC: 10.4 G/DL (ref 11.9–15.5)
IMM GRANULOCYTES # BLD: 0 10*3/MM3 (ref 0–0.03)
IMM GRANULOCYTES NFR BLD: 0 % (ref 0–0.5)
LDLC SERPL CALC-MCNC: 86 MG/DL (ref 0–100)
LYMPHOCYTES # BLD AUTO: 1.35 10*3/MM3 (ref 0.9–4.8)
LYMPHOCYTES NFR BLD AUTO: 22.3 % (ref 19.6–45.3)
MCH RBC QN AUTO: 24 PG (ref 26.9–32)
MCHC RBC AUTO-ENTMCNC: 30 G/DL (ref 32.4–36.3)
MCV RBC AUTO: 80 FL (ref 80.5–98.2)
MONOCYTES # BLD AUTO: 0.53 10*3/MM3 (ref 0.2–1.2)
MONOCYTES NFR BLD AUTO: 8.7 % (ref 5–12)
NEUTROPHILS # BLD AUTO: 4.01 10*3/MM3 (ref 1.9–8.1)
NEUTROPHILS NFR BLD AUTO: 66.2 % (ref 42.7–76)
PLATELET # BLD AUTO: 267 10*3/MM3 (ref 140–500)
POTASSIUM SERPL-SCNC: 4.3 MMOL/L (ref 3.5–5.2)
PROT SERPL-MCNC: 6.2 G/DL (ref 6–8.5)
RBC # BLD AUTO: 4.34 10*6/MM3 (ref 3.9–5.2)
SODIUM SERPL-SCNC: 140 MMOL/L (ref 136–145)
TRIGL SERPL-MCNC: 56 MG/DL (ref 0–150)
TSH SERPL DL<=0.005 MIU/L-ACNC: 2.57 MIU/ML (ref 0.27–4.2)
VLDLC SERPL CALC-MCNC: 11.2 MG/DL (ref 5–40)
WBC # BLD AUTO: 6.06 10*3/MM3 (ref 4.5–10.7)

## 2018-12-14 LAB
FERRITIN SERPL-MCNC: 6.96 NG/ML (ref 13–150)
IRON SERPL-MCNC: 59 MCG/DL (ref 37–145)
Lab: NORMAL
WRITTEN AUTHORIZATION: NORMAL

## 2018-12-21 ENCOUNTER — TELEPHONE (OUTPATIENT)
Dept: FAMILY MEDICINE CLINIC | Facility: CLINIC | Age: 48
End: 2018-12-21

## 2018-12-21 DIAGNOSIS — D64.9 ANEMIA, UNSPECIFIED TYPE: Primary | ICD-10-CM

## 2018-12-21 NOTE — TELEPHONE ENCOUNTER
----- Message from NIDA Lopes sent at 12/20/2018  4:58 PM EST -----  Patient has worsening anemia.  Need to refer for colonoscopy to evaluate for cause.

## 2018-12-26 ENCOUNTER — OFFICE VISIT (OUTPATIENT)
Dept: RETAIL CLINIC | Facility: CLINIC | Age: 48
End: 2018-12-26

## 2018-12-26 VITALS
TEMPERATURE: 98.2 F | DIASTOLIC BLOOD PRESSURE: 70 MMHG | SYSTOLIC BLOOD PRESSURE: 128 MMHG | RESPIRATION RATE: 18 BRPM | OXYGEN SATURATION: 99 % | HEART RATE: 74 BPM

## 2018-12-26 DIAGNOSIS — J01.40 ACUTE PANSINUSITIS, RECURRENCE NOT SPECIFIED: Primary | ICD-10-CM

## 2018-12-26 PROCEDURE — 99213 OFFICE O/P EST LOW 20 MIN: CPT | Performed by: NURSE PRACTITIONER

## 2018-12-26 RX ORDER — AMOXICILLIN AND CLAVULANATE POTASSIUM 875; 125 MG/1; MG/1
1 TABLET, FILM COATED ORAL 2 TIMES DAILY
Qty: 20 TABLET | Refills: 0 | Status: SHIPPED | OUTPATIENT
Start: 2018-12-26 | End: 2019-01-05

## 2018-12-26 NOTE — PROGRESS NOTES
Subjective   Inez Olivares is a 48 y.o. female.     Sinusitis   This is a new problem. The current episode started 1 to 4 weeks ago. The problem has been gradually worsening since onset. There has been no fever. Associated symptoms include chills, coughing, headaches, neck pain, sinus pressure and a sore throat. (Fatigue  ) Treatments tried: bromfed, vicks vapor rub, dayquil  The treatment provided mild relief.   Cough   This is a new problem. The current episode started 1 to 4 weeks ago. The problem has been gradually worsening. The cough is productive of brown sputum. Associated symptoms include chills, headaches and a sore throat.        The following portions of the patient's history were reviewed and updated as appropriate: allergies, current medications, past family history, past medical history, past social history, past surgical history and problem list.    Review of Systems   Constitutional: Positive for chills.   HENT: Positive for sinus pressure and sore throat.    Respiratory: Positive for cough.    Gastrointestinal: Negative.    Musculoskeletal: Positive for neck pain.   Neurological: Positive for headache.       Objective   Physical Exam   Constitutional: She is cooperative. No distress.   HENT:   Head: Normocephalic.   Right Ear: Hearing, tympanic membrane, external ear and ear canal normal.   Left Ear: Hearing, tympanic membrane, external ear and ear canal normal.   Nose: Mucosal edema and sinus tenderness present. Right sinus exhibits maxillary sinus tenderness and frontal sinus tenderness. Left sinus exhibits maxillary sinus tenderness and frontal sinus tenderness.   Mouth/Throat: Posterior oropharyngeal edema and posterior oropharyngeal erythema present.   Eyes: Conjunctivae, EOM and lids are normal. Pupils are equal, round, and reactive to light.   Neck: Trachea normal and full passive range of motion without pain.   Cardiovascular: Normal rate, regular rhythm and normal pulses.    Pulmonary/Chest: Effort normal and breath sounds normal.   Lymphadenopathy:     She has cervical adenopathy.        Right cervical: Superficial cervical adenopathy present.        Left cervical: Superficial cervical adenopathy present.   Neurological: She is alert.   Skin: Skin is warm. Capillary refill takes less than 2 seconds.   Psychiatric: She has a normal mood and affect. Her speech is normal and behavior is normal.   Vitals reviewed.        Assessment/Plan   Inez was seen today for sinusitis and cough.    Diagnoses and all orders for this visit:    Acute pansinusitis, recurrence not specified    Other orders  -     amoxicillin-clavulanate (AUGMENTIN) 875-125 MG per tablet; Take 1 tablet by mouth 2 (Two) Times a Day for 10 days.

## 2018-12-26 NOTE — PATIENT INSTRUCTIONS

## 2019-01-17 ENCOUNTER — OFFICE VISIT (OUTPATIENT)
Dept: GASTROENTEROLOGY | Facility: CLINIC | Age: 49
End: 2019-01-17

## 2019-01-17 VITALS
DIASTOLIC BLOOD PRESSURE: 68 MMHG | WEIGHT: 171.6 LBS | HEIGHT: 64 IN | BODY MASS INDEX: 29.3 KG/M2 | TEMPERATURE: 97.9 F | SYSTOLIC BLOOD PRESSURE: 110 MMHG

## 2019-01-17 DIAGNOSIS — D50.9 IRON DEFICIENCY ANEMIA, UNSPECIFIED IRON DEFICIENCY ANEMIA TYPE: Primary | ICD-10-CM

## 2019-01-17 DIAGNOSIS — Z83.71 FAMILY HISTORY OF POLYPS IN THE COLON: ICD-10-CM

## 2019-01-17 DIAGNOSIS — N92.0 MENORRHAGIA WITH REGULAR CYCLE: ICD-10-CM

## 2019-01-17 PROBLEM — Z83.719 FAMILY HISTORY OF POLYPS IN THE COLON: Status: ACTIVE | Noted: 2019-01-17

## 2019-01-17 PROCEDURE — 99204 OFFICE O/P NEW MOD 45 MIN: CPT | Performed by: INTERNAL MEDICINE

## 2019-01-17 RX ORDER — SODIUM CHLORIDE, SODIUM LACTATE, POTASSIUM CHLORIDE, CALCIUM CHLORIDE 600; 310; 30; 20 MG/100ML; MG/100ML; MG/100ML; MG/100ML
30 INJECTION, SOLUTION INTRAVENOUS CONTINUOUS
Status: CANCELLED | OUTPATIENT
Start: 2019-01-21

## 2019-01-17 NOTE — PATIENT INSTRUCTIONS
Schedule the egd and colonoscopy    Continue the iron pills    Follow up with your gyencologist regarding your periods    For any additional questions, concerns or changes to your condition after today's office visit please contact the office at [phone].

## 2019-01-17 NOTE — PROGRESS NOTES
Chief Complaint   Patient presents with   • Anemia       Subjective     HPI    Inez Olivares is a 49 y.o. female with a past medical history noted below who presents for evaluation of iron deficiency anemia.  This was found on routine labs with her pcp in December.  Her Hb was found to be 10.4 and ferritin 6.96-- representing a moderately severe anemia.  She was not having any particular symptoms of the anemia.  She has not had any overt bleeding.  Not taking excess NSAIDs.  She is still having periods and at at times they are heavy though she does not feel they are problematic.  She used to donate blood about 2 x per year but does not recall donating at all last year due to being told her iron was too low. Not a vegetarian or vegan, eats a healthy diet.  She does not have any issues with abdominal pain, reflux, nausea, vomiting, diarrhea, nor constipation.    Mother with colon polyps.  No GI malignancies in her family, no celiac or IBD.  Works for [employer].  No smoking, no excess ETOH.  Works at [employer].        Past Medical History:   Diagnosis Date   • Anemia    • Asthma     ALLERGY RELATED   • PONV (postoperative nausea and vomiting)          Current Outpatient Medications:   •  ibuprofen (ADVIL,MOTRIN) 200 MG tablet, Take 200 mg by mouth Every 6 (Six) Hours As Needed for Mild Pain ., Disp: , Rfl:   •  IRON-VITAMINS PO, Take  by mouth., Disp: , Rfl:   •  Multiple Vitamins-Minerals (MULTIVITAMIN ADULT PO), Take  by mouth., Disp: , Rfl:     No Known Allergies    Social History     Socioeconomic History   • Marital status:      Spouse name: Not on file   • Number of children: Not on file   • Years of education: Not on file   • Highest education level: Not on file   Social Needs   • Financial resource strain: Not on file   • Food insecurity - worry: Not on file   • Food insecurity - inability: Not on file   • Transportation needs - medical: Not on file   • Transportation needs - non-medical: Not on file    Occupational History   • Not on file   Tobacco Use   • Smoking status: Never Smoker   • Smokeless tobacco: Never Used   Substance and Sexual Activity   • Alcohol use: Yes     Comment: occ   • Drug use: No   • Sexual activity: No     Comment: lmp 3/5/16   Other Topics Concern   • Not on file   Social History Narrative   • Not on file       Family History   Problem Relation Age of Onset   • Uterine cancer Mother    • Heart attack Mother    • Hypertension Mother    • Depression Mother    • Arthritis Mother    • Cataracts Mother    • Alcohol abuse Father    • COPD Father         smoker   • Depression Sister    • Lung disease Maternal Grandmother    • Stroke Paternal Grandmother        Review of Systems   Constitutional: Negative for activity change, appetite change and fatigue.   HENT: Negative for sore throat and trouble swallowing.    Respiratory: Negative.    Cardiovascular: Negative.    Gastrointestinal: Negative for abdominal distention, abdominal pain and blood in stool.   Endocrine: Negative for cold intolerance and heat intolerance.   Genitourinary: Negative for difficulty urinating, dysuria and frequency.   Musculoskeletal: Negative for arthralgias, back pain and myalgias.   Skin: Negative.    Hematological: Negative for adenopathy. Does not bruise/bleed easily.   All other systems reviewed and are negative.      Objective     Vitals:    01/17/19 1043   BP: 110/68   Temp: 97.9 °F (36.6 °C)         01/17/19  1043   Weight: 77.8 kg (171 lb 9.6 oz)     Body mass index is 29.46 kg/m².    Physical Exam   Constitutional: She is oriented to person, place, and time. She appears well-developed and well-nourished. No distress.   HENT:   Head: Normocephalic and atraumatic.   Right Ear: External ear normal.   Left Ear: External ear normal.   Nose: Nose normal.   Mouth/Throat: Oropharynx is clear and moist.   Eyes: Conjunctivae and EOM are normal. Right eye exhibits no discharge. Left eye exhibits no discharge. No scleral  icterus.   Neck: Normal range of motion. Neck supple. No thyromegaly present.   No supraclavicular adenopathy   Cardiovascular: Normal rate, regular rhythm, normal heart sounds and intact distal pulses. Exam reveals no gallop.   No murmur heard.  No lower extremity edema   Pulmonary/Chest: Effort normal and breath sounds normal. No respiratory distress. She has no wheezes.   Abdominal: Soft. Normal appearance and bowel sounds are normal. She exhibits no distension and no mass. There is no hepatosplenomegaly. There is no tenderness. There is no rigidity, no rebound and no guarding. No hernia.   Genitourinary:   Genitourinary Comments: Rectal exam deferred   Musculoskeletal: Normal range of motion. She exhibits no edema or tenderness.   No atrophy of upper or lower extremities.  Normal digits and nails of both hands.   Lymphadenopathy:     She has no cervical adenopathy.   Neurological: She is alert and oriented to person, place, and time. She displays no atrophy. Coordination normal.   Skin: Skin is warm and dry. No rash noted. She is not diaphoretic. No erythema.   Psychiatric: She has a normal mood and affect. Her behavior is normal. Judgment and thought content normal.   Vitals reviewed.      WBC   Date Value Ref Range Status   12/13/2018 6.06 4.50 - 10.70 10*3/mm3 Final     RBC   Date Value Ref Range Status   12/13/2018 4.34 3.90 - 5.20 10*6/mm3 Final     Hemoglobin   Date Value Ref Range Status   12/13/2018 10.4 (L) 11.9 - 15.5 g/dL Final   03/24/2016 11.4 (L) 11.9 - 15.5 g/dL Final     Hematocrit   Date Value Ref Range Status   12/13/2018 34.7 (L) 35.6 - 45.5 % Final   03/24/2016 35.3 (L) 35.6 - 45.5 % Final     MCV   Date Value Ref Range Status   12/13/2018 80.0 (L) 80.5 - 98.2 fL Final   03/24/2016 82.7 80.5 - 98.2 fL Final     MCH   Date Value Ref Range Status   12/13/2018 24.0 (L) 26.9 - 32.0 pg Final   03/24/2016 26.7 (L) 26.9 - 32.7 pg Final     MCHC   Date Value Ref Range Status   12/13/2018 30.0 (L)  32.4 - 36.3 g/dL Final   03/24/2016 32.3 (L) 32.4 - 36.3 g/dL Final     RDW   Date Value Ref Range Status   12/13/2018 14.4 (H) 11.7 - 13.0 % Final   03/24/2016 12.7 11.7 - 13.0 % Final     RDW-SD   Date Value Ref Range Status   03/24/2016 38.2 37.0 - 54.0 fl Final     MPV   Date Value Ref Range Status   03/24/2016 9.3 6.0 - 12.0 fL Final     Platelets   Date Value Ref Range Status   12/13/2018 267 140 - 500 10*3/mm3 Final   03/24/2016 229 140 - 500 10*3/mm3 Final     Neutrophil %   Date Value Ref Range Status   03/24/2016 63.9 42.7 - 76.0 % Final     Neutrophil Rel %   Date Value Ref Range Status   12/13/2018 66.2 42.7 - 76.0 % Final     Lymphocyte %   Date Value Ref Range Status   03/24/2016 24.6 19.6 - 45.3 % Final     Lymphocyte Rel %   Date Value Ref Range Status   12/13/2018 22.3 19.6 - 45.3 % Final     Monocyte %   Date Value Ref Range Status   03/24/2016 9.3 5.0 - 12.0 % Final     Monocyte Rel %   Date Value Ref Range Status   12/13/2018 8.7 5.0 - 12.0 % Final     Eosinophil %   Date Value Ref Range Status   03/24/2016 1.8 0.3 - 6.2 % Final     Eosinophil Rel %   Date Value Ref Range Status   12/13/2018 2.1 0.3 - 6.2 % Final     Basophil %   Date Value Ref Range Status   03/24/2016 0.4 0.0 - 1.5 % Final     Basophil Rel %   Date Value Ref Range Status   12/13/2018 0.7 0.0 - 1.5 % Final     Immature Grans %   Date Value Ref Range Status   03/24/2016 0.0 0.0 - 0.5 % Final     Neutrophils, Absolute   Date Value Ref Range Status   03/24/2016 3.57 1.90 - 8.10 10*3/mm3 Final     Neutrophils Absolute   Date Value Ref Range Status   12/13/2018 4.01 1.90 - 8.10 10*3/mm3 Final     Lymphocytes, Absolute   Date Value Ref Range Status   03/24/2016 1.37 0.90 - 4.80 10*3/mm3 Final     Lymphocytes Absolute   Date Value Ref Range Status   12/13/2018 1.35 0.90 - 4.80 10*3/mm3 Final     Monocytes, Absolute   Date Value Ref Range Status   03/24/2016 0.52 0.20 - 1.20 10*3/mm3 Final     Monocytes Absolute   Date Value Ref Range  Status   12/13/2018 0.53 0.20 - 1.20 10*3/mm3 Final     Eosinophils, Absolute   Date Value Ref Range Status   03/24/2016 0.10 0.00 - 0.70 10*3/mm3 Final     Eosinophils Absolute   Date Value Ref Range Status   12/13/2018 0.13 0.00 - 0.70 10*3/mm3 Final     Basophils, Absolute   Date Value Ref Range Status   03/24/2016 0.02 0.00 - 0.20 10*3/mm3 Final     Basophils Absolute   Date Value Ref Range Status   12/13/2018 0.04 0.00 - 0.20 10*3/mm3 Final     Immature Grans, Absolute   Date Value Ref Range Status   03/24/2016 0.00 0.00 - 0.03 10*3/mm3 Final       Sodium   Date Value Ref Range Status   12/13/2018 140 136 - 145 mmol/L Final     Potassium   Date Value Ref Range Status   12/13/2018 4.3 3.5 - 5.2 mmol/L Final     Total CO2   Date Value Ref Range Status   12/13/2018 28.0 22.0 - 29.0 mmol/L Final     Chloride   Date Value Ref Range Status   12/13/2018 101 98 - 107 mmol/L Final     Creatinine   Date Value Ref Range Status   12/13/2018 0.68 0.57 - 1.00 mg/dL Final     BUN   Date Value Ref Range Status   12/13/2018 17 6 - 20 mg/dL Final     BUN/Creatinine Ratio   Date Value Ref Range Status   12/13/2018 25.0 7.0 - 25.0 Final     Calcium   Date Value Ref Range Status   12/13/2018 9.2 8.6 - 10.5 mg/dL Final     eGFR Non  Am   Date Value Ref Range Status   12/13/2018 92 >60 mL/min/1.73 Final     Alkaline Phosphatase   Date Value Ref Range Status   12/13/2018 59 39 - 117 U/L Final     ALT (SGPT)   Date Value Ref Range Status   12/13/2018 20 1 - 33 U/L Final     AST (SGOT)   Date Value Ref Range Status   12/13/2018 19 1 - 32 U/L Final     Total Bilirubin   Date Value Ref Range Status   12/13/2018 0.3 0.1 - 1.2 mg/dL Final     Albumin   Date Value Ref Range Status   12/13/2018 4.10 3.50 - 5.20 g/dL Final     A/G Ratio   Date Value Ref Range Status   12/13/2018 2.0 g/dL Final         Imaging Results (last 7 days)     ** No results found for the last 168 hours. **            No notes on file    Assessment/Plan    Iron  deficiency anemia: suspect gradual onset.  ? Menses though she does not seem to think this has been to abnormal for her vs GI loss, she does have family history of polyps    Family history of colon polyps: mother with colon polyps    Menorrhagia: she does seem to describe menses that starts fairly heavy but doesn't feel this is too abnormal    Plan    Egd and colonoscopy for work up of GLORIA    Continue the iron pills    Follow up with your gyencologist regarding your periods, may need OCP      Inez was seen today for anemia.    Diagnoses and all orders for this visit:    Iron deficiency anemia, unspecified iron deficiency anemia type  -     Case Request; Standing  -     Follow Anesthesia Guidelines / Standing Orders; Future  -     Implement Anesthesia Orders Day of Procedure; Standing  -     Obtain Informed Consent; Standing  -     lactated ringers infusion; Infuse 30 mL/hr into a venous catheter Continuous.  -     Case Request    Family history of polyps in the colon  -     Case Request; Standing  -     Follow Anesthesia Guidelines / Standing Orders; Future  -     Implement Anesthesia Orders Day of Procedure; Standing  -     Obtain Informed Consent; Standing  -     lactated ringers infusion; Infuse 30 mL/hr into a venous catheter Continuous.  -     Case Request    Menorrhagia with regular cycle        I have discussed the above plan with the patient.  They verbalize understanding and are in agreement with the plan.  They have been advised to contact the office for any questions, concerns, or changes related to their health.    Dictated utilizing Dragon dictation

## 2019-01-21 ENCOUNTER — ANESTHESIA (OUTPATIENT)
Dept: GASTROENTEROLOGY | Facility: HOSPITAL | Age: 49
End: 2019-01-21

## 2019-01-21 ENCOUNTER — ANESTHESIA EVENT (OUTPATIENT)
Dept: GASTROENTEROLOGY | Facility: HOSPITAL | Age: 49
End: 2019-01-21

## 2019-01-21 ENCOUNTER — HOSPITAL ENCOUNTER (OUTPATIENT)
Facility: HOSPITAL | Age: 49
Setting detail: HOSPITAL OUTPATIENT SURGERY
Discharge: HOME | End: 2019-01-21
Attending: INTERNAL MEDICINE | Admitting: INTERNAL MEDICINE

## 2019-01-21 VITALS
RESPIRATION RATE: 12 BRPM | OXYGEN SATURATION: 100 % | TEMPERATURE: 97.8 F | BODY MASS INDEX: 29.05 KG/M2 | HEIGHT: 64 IN | WEIGHT: 170.13 LBS | SYSTOLIC BLOOD PRESSURE: 101 MMHG | HEART RATE: 67 BPM | DIASTOLIC BLOOD PRESSURE: 64 MMHG

## 2019-01-21 DIAGNOSIS — D50.9 IRON DEFICIENCY ANEMIA, UNSPECIFIED IRON DEFICIENCY ANEMIA TYPE: ICD-10-CM

## 2019-01-21 DIAGNOSIS — Z83.71 FAMILY HISTORY OF POLYPS IN THE COLON: ICD-10-CM

## 2019-01-21 PROCEDURE — 45385 COLONOSCOPY W/LESION REMOVAL: CPT | Performed by: INTERNAL MEDICINE

## 2019-01-21 PROCEDURE — 45380 COLONOSCOPY AND BIOPSY: CPT | Performed by: INTERNAL MEDICINE

## 2019-01-21 PROCEDURE — 88305 TISSUE EXAM BY PATHOLOGIST: CPT | Performed by: INTERNAL MEDICINE

## 2019-01-21 PROCEDURE — 88342 IMHCHEM/IMCYTCHM 1ST ANTB: CPT | Performed by: INTERNAL MEDICINE

## 2019-01-21 PROCEDURE — 43239 EGD BIOPSY SINGLE/MULTIPLE: CPT | Performed by: INTERNAL MEDICINE

## 2019-01-21 PROCEDURE — 25010000002 PROPOFOL 10 MG/ML EMULSION: Performed by: ANESTHESIOLOGY

## 2019-01-21 RX ORDER — SODIUM CHLORIDE, SODIUM LACTATE, POTASSIUM CHLORIDE, CALCIUM CHLORIDE 600; 310; 30; 20 MG/100ML; MG/100ML; MG/100ML; MG/100ML
30 INJECTION, SOLUTION INTRAVENOUS CONTINUOUS
Status: DISCONTINUED | OUTPATIENT
Start: 2019-01-21 | End: 2019-01-21 | Stop reason: HOSPADM

## 2019-01-21 RX ORDER — SODIUM CHLORIDE 0.9 % (FLUSH) 0.9 %
3 SYRINGE (ML) INJECTION EVERY 12 HOURS SCHEDULED
Status: DISCONTINUED | OUTPATIENT
Start: 2019-01-21 | End: 2019-01-21 | Stop reason: HOSPADM

## 2019-01-21 RX ORDER — PROPOFOL 10 MG/ML
VIAL (ML) INTRAVENOUS CONTINUOUS PRN
Status: DISCONTINUED | OUTPATIENT
Start: 2019-01-21 | End: 2019-01-21 | Stop reason: SURG

## 2019-01-21 RX ORDER — SODIUM CHLORIDE 0.9 % (FLUSH) 0.9 %
3-10 SYRINGE (ML) INJECTION AS NEEDED
Status: DISCONTINUED | OUTPATIENT
Start: 2019-01-21 | End: 2019-01-21 | Stop reason: HOSPADM

## 2019-01-21 RX ORDER — PROPOFOL 10 MG/ML
VIAL (ML) INTRAVENOUS AS NEEDED
Status: DISCONTINUED | OUTPATIENT
Start: 2019-01-21 | End: 2019-01-21 | Stop reason: SURG

## 2019-01-21 RX ORDER — LIDOCAINE HYDROCHLORIDE 20 MG/ML
INJECTION, SOLUTION INFILTRATION; PERINEURAL AS NEEDED
Status: DISCONTINUED | OUTPATIENT
Start: 2019-01-21 | End: 2019-01-21 | Stop reason: SURG

## 2019-01-21 RX ORDER — SODIUM CHLORIDE 9 MG/ML
30 INJECTION, SOLUTION INTRAVENOUS CONTINUOUS PRN
Status: DISCONTINUED | OUTPATIENT
Start: 2019-01-21 | End: 2019-01-21 | Stop reason: HOSPADM

## 2019-01-21 RX ADMIN — LIDOCAINE HYDROCHLORIDE 50 MG: 20 INJECTION, SOLUTION INFILTRATION; PERINEURAL at 15:31

## 2019-01-21 RX ADMIN — PROPOFOL 140 MCG/KG/MIN: 10 INJECTION, EMULSION INTRAVENOUS at 15:31

## 2019-01-21 RX ADMIN — PROPOFOL 125 MG: 10 INJECTION, EMULSION INTRAVENOUS at 15:31

## 2019-01-21 RX ADMIN — SODIUM CHLORIDE 30 ML/HR: 9 INJECTION, SOLUTION INTRAVENOUS at 15:24

## 2019-01-21 NOTE — ANESTHESIA POSTPROCEDURE EVALUATION
"Patient: Inez Olivares    Procedure Summary     Date:  01/21/19 Room / Location:  [facility] ENDOSCOPY 1 /  [facility]    Anesthesia Start:  1527 Anesthesia Stop:  1611    Procedures:       COLONOSCOPY (N/A )      ESOPHAGOGASTRODUODENOSCOPY (N/A Esophagus) Diagnosis:       Iron deficiency anemia, unspecified iron deficiency anemia type      Family history of polyps in the colon      (Iron deficiency anemia, unspecified iron deficiency anemia type [D50.9])      (Family history of polyps in the colon [Z83.71])    Surgeon:  Sindhu Avilez MD Provider:  Yuan Carlisle MD    Anesthesia Type:  MAC ASA Status:  1          Anesthesia Type: MAC  Last vitals  BP   107/78 (01/21/19 1621)   Temp   36.6 °C (97.8 °F) (01/21/19 1500)   Pulse   78 (01/21/19 1621)   Resp   12 (01/21/19 1621)     SpO2   94 % (01/21/19 1621)     Post Anesthesia Care and Evaluation    Patient participation: complete - patient participated  Level of consciousness: awake  Pain management: adequate  Airway patency: patent  Anesthetic complications: No anesthetic complications    Cardiovascular status: acceptable  Respiratory status: acceptable  Hydration status: acceptable    Comments: /78 (BP Location: Left arm, Patient Position: Lying)   Pulse 78   Temp 36.6 °C (97.8 °F) (Oral)   Resp 12   Ht 162.6 cm (64\")   Wt 77.2 kg (170 lb 2 oz)   [facility] 12/28/2018   SpO2 94%   BMI 29.20 kg/m²         "

## 2019-01-21 NOTE — DISCHARGE INSTRUCTIONS
Dr. Avilez  [phone]    Esophagogastroduodenoscopy, Care After    Refer to this sheet in the next few weeks. These instructions provide you with information about caring for yourself after your procedure. Your health care provider may also give you more specific instructions. Your treatment has been planned according to current medical practices, but problems sometimes occur. Call your health care provider if you have any problems or questions after your procedure.  What can I expect after the procedure?  After the procedure, it is common to have:  · A sore throat.  · Nausea.  · Bloating.  · Dizziness.  · Fatigue.    Follow these instructions at home:  · Do not eat or drink anything until the numbing medicine (local anesthetic) has worn off and your gag reflex has returned. You will know that the local anesthetic has worn off when you can swallow comfortably.  · Do not drive for 24 hours if you received a medicine to help you relax (sedative).  · If your health care provider took a tissue sample for testing during the procedure, make sure to get your test results. This is your responsibility. Ask your health care provider or the department performing the test when your results will be ready.  · Keep all follow-up visits as told by your health care provider. This is important.  Contact a health care provider if:  · You cannot stop coughing.  · You are not urinating.  · You are urinating less than usual.  Get help right away if:  · You have trouble swallowing.  · You cannot eat or drink.  · You have throat or chest pain that gets worse.  · You are dizzy or light-headed.  · You faint.  · You have nausea or vomiting.  · You have chills.  · You have a fever.  · You have severe abdominal pain.  · You have black, tarry, or bloody stools.  This information is not intended to replace advice given to you by your health care provider. Make sure you discuss any questions you have with your health care provider.  Document  Released: 12/04/2013 Document Revised: 05/25/2017 Document Reviewed: 11/10/2016  [employer] Interactive Patient Education © 2018 [employer]    Gastritis, Adult    Gastritis is swelling (inflammation) of the stomach. When you have this condition, you can have these problems (symptoms):  · Pain in your stomach.  · A burning feeling in your stomach.  · Feeling sick to your stomach (nauseous).  · Throwing up (vomiting).  · Feeling too full after you eat.    It is important to get help for this condition. Without help, your stomach can bleed, and you can get sores (ulcers) in your stomach.  Follow these instructions at home:  · Take over-the-counter and prescription medicines only as told by your doctor.  · If you were prescribed an antibiotic medicine, take it as told by your doctor. Do not stop taking it even if you start to feel better.  · Drink enough fluid to keep your pee (urine) clear or pale yellow.  · Instead of eating big meals, eat small meals often.  Contact a health care provider if:  · Your problems get worse.  · Your problems go away and then come back.  Get help right away if:  · You throw up blood or something that looks like coffee grounds.  · You have black or dark red poop (stools).  · You cannot keep fluids down.  · Your stomach pain gets worse.  · You have a fever.  · You do not feel better after 1 week.  This information is not intended to replace advice given to you by your health care provider. Make sure you discuss any questions you have with your health care provider.  Document Released: 06/05/2009 Document Revised: 08/16/2017 Document Reviewed: 09/10/2016  [employer] Patient Education © 2018 [employer].    Duodenitis    Duodenitis is inflammation of the lining of the first part of the small intestine (duodenum). It is commonly caused by a bacterial infection, which may also lead to open sores (ulcers) in the intestine.  Duodenitis may develop suddenly and last for a short time  (acute) or it may develop gradually and last for months or years (chronic).  What are the causes?  The most common cause of duodenitis is an infection from H. pylori bacteria. Other causes of this condition include:  · Long-term use of NSAIDs.  · Excessive use of alcohol.  · An infection of the small intestine (giardiasis).  · Crohn’s disease.  · Certain diseases of the immune system.  · Certain treatments for cancer.    What increases the risk?    The following factors may make you more likely to develop duodenitis:  · Smoking cigarettes.  · Drinking alcohol.  · Having a family history of duodenitis.  · Taking NSAIDs.  · Eating a high-fat diet.    What are the signs or symptoms?  Symptoms of this condition may include:  · Gnawing or burning pain in the upper center of the abdomen (epigastric pain). This may get worse when the stomach is empty and may get better after eating.  · Abdominal cramps.  · Nausea and vomiting.  · Bloody vomit.  · Stools that are bloody, dark, or look like tar.  · Diarrhea.  · Weight loss.  · Fatigue.    How is this diagnosed?  This condition may be diagnosed based on your medical history and a physical exam. You may also have tests, such as:  · Blood tests.  · Stool tests.  · A test that checks the gases in your breath.  · An X-ray that is done after you swallow a liquid (barium) that makes your digestive tract easier to see.  · Endoscopy. This is an exam of the duodenum that is done by putting a thin tube with a tiny camera on the end down your throat (endoscopy). A sample of tissue from your duodenum (biopsy) may be removed with the endoscope and examined under a microscope for signs of inflammation and infection.    How is this treated?  Treatment depends on the cause of your condition. Treatment may include:  · Antibiotic medicine to treat H. pylori infection.  · Stopping your intake of NSAIDs.  · Medicine to reduce stomach acids.  · Medicine to treat Crohn’s disease.  · Surgery to  treat severe inflammation that causes scarring or severe bleeding.    Follow these instructions at home:  Medicines  · Take over-the-counter and prescription medicines only as told by your health care provider.  · If you were prescribed antibiotic medicine, take it as told by your health care provider. Do not stop taking the antibiotic even if you start to feel better.  Eating and drinking    · Eat small, frequent meals.  · Do not drink alcohol.  · Drink enough water to keep your urine clear or pale yellow.  · Follow instructions from your health care provider about eating or drinking restrictions. You may be asked to avoid:  ? Caffeinated drinks.  ? Chocolate.  ? Peppermint or mint-flavored food or drinks.  ? Garlic.  ? Onions.  ? Spicy foods.  ? Citrus fruits.  ? Tomato-based foods.  ? Fatty foods.  ? Fried foods.  Contact a health care provider if:  · You have a fever.  · Your symptoms come back, get worse, or do not get better with treatment.  Get help right away if:  · You vomit blood.  · You have severe abdominal pain.  · Your abdomen swells and is painful.  · You have a lot of blood in your stool.  · You feel dizzy or light-headed.  This information is not intended to replace advice given to you by your health care provider. Make sure you discuss any questions you have with your health care provider.  Document Released: 04/14/2014 Document Revised: 05/25/2017 Document Reviewed: 10/20/2016  [employer] Interactive Patient Education © 2018 [employer]      Gastric Polyps    A gastric polyp, also called a stomach polyp, is a growth on the lining of the stomach. Most polyps are not dangerous, but some can be harmful because of their size, location, or type. Polyps that can become harmful include:  · Large polyps. These can turn into sores (ulcers). Ulcers can lead to stomach bleeding.  · Polyps that block food from moving from the stomach to the small intestine (gastric outlet obstruction).  · A type of polyp  called an adenoma. This type of polyp can become cancerous.    What are the causes?  Gastric polyps form when the lining of the stomach gets inflamed or damaged. Stomach inflammation and damage may be caused by:  · A long-lasting stomach condition, such as gastritis.  · Certain medicines used to reduce stomach acid.  · An inherited condition called familial adenomatous polyposis.    What are the signs or symptoms?  Usually, this condition does not cause any symptoms. If you do have symptoms, they may include:  · Pain or tenderness in the abdomen.  · Nausea.  · Trouble eating or swallowing.  · Blood in the stool.  · Anemia.    How is this diagnosed?  Gastric polyps are diagnosed with:  · A medical procedure called endoscopy.  · A lab test in which a part of the polyp is examined. This test is done with a sample of polyp tissue (biopsy) taken during an endoscopy.    How is this treated?  Treatment depends on the type, location, and size of the polyps. Treatment may involve:  · Having the polyps checked regularly with an endoscopy.  · Having the polyps removed with an endoscopy. This may be done if the polyps are harmful or can become harmful. Removing a polyp often prevents problems from developing.  · Having the polyps removed with a surgery called a partial gastrectomy. This may be done in rare cases to remove very large polyps.  · Treating the underlying condition that caused the polyps.    Follow these instructions at home:  · Take over-the-counter and prescription medicines only as told by your health care provider.  · Keep all follow-up visits as told by your health care provider. This is important.  Contact a health care provider if:  · You develop new symptoms.  · Your symptoms get worse.  Get help right away if:  · You vomit blood.  · You have severe abdominal pain.  · You cannot eat or drink.  · You have blood in your stool.  This information is not intended to replace advice given to you by your health care  provider. Make sure you discuss any questions you have with your health care provider.  Document Released: 12/04/2013 Document Revised: 05/08/2017 Document Reviewed: 01/01/2017  [employer] Interactive Patient Education © 2018 [employer]    Colonoscopy, Adult, Care After    This sheet gives you information about how to care for yourself after your procedure. Your doctor may also give you more specific instructions. If you have problems or questions, call your doctor.  Follow these instructions at home:  General instructions    · For the first 24 hours after the procedure:  ? Do not drive or use machinery.  ? Do not sign important documents.  ? Do not drink alcohol.  ? Do your daily activities more slowly than normal.  ? Eat foods that are soft and easy to digest.  ? Rest often.  · Take over-the-counter or prescription medicines only as told by your doctor.  · It is up to you to get the results of your procedure. Ask your doctor, or the department performing the procedure, when your results will be ready.  To help cramping and bloating:  · Try walking around.  · Put heat on your belly (abdomen) as told by your doctor. Use a heat source that your doctor recommends, such as a moist heat pack or a heating pad.  ? Put a towel between your skin and the heat source.  ? Leave the heat on for 20-30 minutes.  ? Remove the heat if your skin turns bright red. This is especially important if you cannot feel pain, heat, or cold. You can get burned.  Eating and drinking  · Drink enough fluid to keep your pee (urine) clear or pale yellow.  · Return to your normal diet as told by your doctor. Avoid heavy or fried foods that are hard to digest.  · Avoid drinking alcohol for as long as told by your doctor.  Contact a doctor if:  · You have blood in your poop (stool) 2-3 days after the procedure.  Get help right away if:  · You have more than a small amount of blood in your poop.  · You see large clumps of tissue (blood clots) in your  poop.  · Your belly is swollen.  · You feel sick to your stomach (nauseous).  · You throw up (vomit).  · You have a fever.  · You have belly pain that gets worse, and medicine does not help your pain.  This information is not intended to replace advice given to you by your health care provider. Make sure you discuss any questions you have with your health care provider.  Document Released: 01/20/2012 Document Revised: 09/11/2017 Document Reviewed: 09/11/2017  [employer] Interactive Patient Education © 2017 [employer]    Hemorrhoids  Hemorrhoids are swollen veins in and around the rectum or anus. There are two types of hemorrhoids:  · Internal hemorrhoids. These occur in the veins that are just inside the rectum. They may poke through to the outside and become irritated and painful.  · External hemorrhoids. These occur in the veins that are outside of the anus and can be felt as a painful swelling or hard lump near the anus.    Most hemorrhoids do not cause serious problems, and they can be managed with home treatments such as diet and lifestyle changes. If home treatments do not help your symptoms, procedures can be done to shrink or remove the hemorrhoids.  What are the causes?  This condition is caused by increased pressure in the anal area. This pressure may result from various things, including:  · Constipation.  · Straining to have a bowel movement.  · Diarrhea.  · Pregnancy.  · Obesity.  · Sitting for long periods of time.  · Heavy lifting or other activity that causes you to strain.  · Anal sex.    What are the signs or symptoms?  Symptoms of this condition include:  · Pain.  · Anal itching or irritation.  · Rectal bleeding.  · Leakage of stool (feces).  · Anal swelling.  · One or more lumps around the anus.    How is this diagnosed?  This condition can often be diagnosed through a visual exam. Other exams or tests may also be done, such as:  · Examination of the rectal area with a gloved hand (digital  rectal exam).  · Examination of the anal canal using a small tube (anoscope).  · A blood test, if you have lost a significant amount of blood.  · A test to look inside the colon (sigmoidoscopy or colonoscopy).    How is this treated?  This condition can usually be treated at home. However, various procedures may be done if dietary changes, lifestyle changes, and other home treatments do not help your symptoms. These procedures can help make the hemorrhoids smaller or remove them completely. Some of these procedures involve surgery, and others do not. Common procedures include:  · Rubber band ligation. Rubber bands are placed at the base of the hemorrhoids to cut off the blood supply to them.  · Sclerotherapy. Medicine is injected into the hemorrhoids to shrink them.  · Infrared coagulation. A type of light energy is used to get rid of the hemorrhoids.  · Hemorrhoidectomy surgery. The hemorrhoids are surgically removed, and the veins that supply them are tied off.  · Stapled hemorrhoidopexy surgery. A circular stapling device is used to remove the hemorrhoids and use staples to cut off the blood supply to them.    Follow these instructions at home:  Eating and drinking  · Eat foods that have a lot of fiber in them, such as whole grains, beans, nuts, fruits, and vegetables. Ask your health care provider about taking products that have added fiber (fiber supplements).  · Drink enough fluid to keep your urine clear or pale yellow.  Managing pain and swelling  · Take warm sitz baths for 20 minutes, 3-4 times a day to ease pain and discomfort.  · If directed, apply ice to the affected area. Using ice packs between sitz baths may be helpful.  ? Put ice in a plastic bag.  ? Place a towel between your skin and the bag.  ? Leave the ice on for 20 minutes, 2-3 times a day.  General instructions  · Take over-the-counter and prescription medicines only as told by your health care provider.  · Use medicated creams or  suppositories as told.  · Exercise regularly.  · Go to the bathroom when you have the urge to have a bowel movement. Do not wait.  · Avoid straining to have bowel movements.  · Keep the anal area dry and clean. Use wet toilet paper or moist towelettes after a bowel movement.  · Do not sit on the toilet for long periods of time. This increases blood pooling and pain.  Contact a health care provider if:  · You have increasing pain and swelling that are not controlled by treatment or medicine.  · You have uncontrolled bleeding.  · You have difficulty having a bowel movement, or you are unable to have a bowel movement.  · You have pain or inflammation outside the area of the hemorrhoids.  This information is not intended to replace advice given to you by your health care provider. Make sure you discuss any questions you have with your health care provider.  Document Released: 12/15/2001 Document Revised: 05/17/2017 Document Reviewed: 08/31/2016  [employer] Interactive Patient Education © 2018 [employer]    Colon Polyps  Polyps are tissue growths inside the body. Polyps can grow in many places, including the large intestine (colon). A polyp may be a round bump or a mushroom-shaped growth. You could have one polyp or several.  Most colon polyps are noncancerous (benign). However, some colon polyps can become cancerous over time.  What are the causes?  The exact cause of colon polyps is not known.  What increases the risk?  This condition is more likely to develop in people who:  · Have a family history of colon cancer or colon polyps.  · Are older than 50 or older than 45 if they are .  · Have inflammatory bowel disease, such as ulcerative colitis or Crohn disease.  · Are overweight.  · Smoke cigarettes.  · Do not get enough exercise.  · Drink too much alcohol.  · Eat a diet that is:  ? High in fat and red meat.  ? Low in fiber.  · Had childhood cancer that was treated with abdominal radiation.    What  are the signs or symptoms?  Most polyps do not cause symptoms. If you have symptoms, they may include:  · Blood coming from your rectum when having a bowel movement.  · Blood in your stool. The stool may look dark red or black.  · A change in bowel habits, such as constipation or diarrhea.    How is this diagnosed?  This condition is diagnosed with a colonoscopy. This is a procedure that uses a lighted, flexible scope to look at the inside of your colon.  How is this treated?  Treatment for this condition involves removing any polyps that are found. Those polyps will then be tested for cancer. If cancer is found, your health care provider will talk to you about options for colon cancer treatment.  Follow these instructions at home:  Diet  · Eat plenty of fiber, such as fruits, vegetables, and whole grains.  · Eat foods that are high in calcium and vitamin D, such as milk, cheese, yogurt, eggs, liver, fish, and broccoli.  · Limit foods high in fat, red meats, and processed meats, such as hot dogs, sausage, dimas, and lunch meats.  · Maintain a healthy weight, or lose weight if recommended by your health care provider.  General instructions  · Do not smoke cigarettes.  · Do not drink alcohol excessively.  · Keep all follow-up visits as told by your health care provider. This is important. This includes keeping regularly scheduled colonoscopies. Talk to your health care provider about when you need a colonoscopy.  · Exercise every day or as told by your health care provider.  Contact a health care provider if:  · You have new or worsening bleeding during a bowel movement.  · You have new or increased blood in your stool.  · You have a change in bowel habits.  · You unexpectedly lose weight.  This information is not intended to replace advice given to you by your health care provider. Make sure you discuss any questions you have with your health care provider.  Document Released: 09/13/2005 Document Revised: 05/25/2017  Document Reviewed: 11/07/2016  [employer] Interactive Patient Education © 2018 [employer]

## 2019-01-21 NOTE — ANESTHESIA PREPROCEDURE EVALUATION
Anesthesia Evaluation     Patient summary reviewed and Nursing notes reviewed   history of anesthetic complications: PONV  NPO Solid Status: > 8 hours  NPO Liquid Status: > 8 hours           Airway   Mallampati: I  TM distance: >3 FB  Dental - normal exam     Pulmonary - normal exam   Cardiovascular - normal exam        Neuro/Psych  GI/Hepatic/Renal/Endo      Musculoskeletal     Abdominal    Substance History      OB/GYN          Other                        Anesthesia Plan    ASA 1     MAC     Anesthetic plan, all risks, benefits, and alternatives have been provided, discussed and informed consent has been obtained with: patient.

## 2019-01-24 LAB
CYTO UR: NORMAL
LAB AP CASE REPORT: NORMAL
LAB AP DIAGNOSIS COMMENT: NORMAL
LAB AP SPECIAL STAINS: NORMAL
PATH REPORT.FINAL DX SPEC: NORMAL
PATH REPORT.GROSS SPEC: NORMAL

## 2019-01-25 DIAGNOSIS — K31.9 MUCOSAL ABNORMALITY OF DUODENUM: Primary | ICD-10-CM

## 2019-01-25 NOTE — PROGRESS NOTES
The biopsies from her small intestine are suggestive of celiac disease.    I have placed orders for her to come in to have some lab work to confirm this.  She does need to continue to the wheat products in the meantime    The polyp in her rectum was benign inflammation.    Her next colonoscopy should be in 5 years, please place in recall.

## 2019-01-28 ENCOUNTER — RESULTS ENCOUNTER (OUTPATIENT)
Dept: GASTROENTEROLOGY | Facility: CLINIC | Age: 49
End: 2019-01-28

## 2019-01-28 DIAGNOSIS — K31.9 MUCOSAL ABNORMALITY OF DUODENUM: ICD-10-CM

## 2019-01-31 ENCOUNTER — TELEPHONE (OUTPATIENT)
Dept: GASTROENTEROLOGY | Facility: CLINIC | Age: 49
End: 2019-01-31

## 2019-01-31 NOTE — TELEPHONE ENCOUNTER
----- Message from Sindhu Avilez MD sent at 1/25/2019 12:34 PM EST -----  The biopsies from her small intestine are suggestive of celiac disease.    I have placed orders for her to come in to have some lab work to confirm this.  She does need to continue to the wheat products in the meantime    The polyp in her rectum was benign inflammation.    Her next colonoscopy should be in 5 years, please place in recall.

## 2019-01-31 NOTE — TELEPHONE ENCOUNTER
Called pt and advised per Dr Avilez that the bx from her sm intestine are suggestive of celiac disease.      She has ordered some lab work to have done to confirm this.  Advised pt to continue to eat wheat products in the meantime.      The polyp in her rectum was benign inflammation.  She recommends a repeat cs/ in 5 yrs. Pt verb understanding.     C/s placed in recall for 01/21/0224.      Pt made lab appt for 02/07 at 8am.

## 2019-02-08 LAB
IGA SERPL-MCNC: 158 MG/DL (ref 87–352)
TTG IGA SER-ACNC: >100 U/ML (ref 0–3)
TTG IGG SER-ACNC: 6 U/ML (ref 0–5)

## 2019-02-11 NOTE — PROGRESS NOTES
Her labs are consistent with celiac disease.    To go on a gluten-free diet.  This will help her iron deficiency anemia.    We need to refer her to [facility], [place]-- can you help with this, thanks    Office follow up with me in May/June

## 2019-02-12 ENCOUNTER — TELEPHONE (OUTPATIENT)
Dept: GASTROENTEROLOGY | Facility: CLINIC | Age: 49
End: 2019-02-12

## 2019-02-12 NOTE — TELEPHONE ENCOUNTER
Returned pt's call and advised per Dr Avilez that her labs are consistent with celiac disease.  She recommends to go on gluten free diet . This will help her iron def anemia.     She will refer her to [facility] in nutrition .   Also advised to f/u with her in may or June.     Pt verb understanding and made appt for 06/17 at 10am with Dr Avilez.     Message sent to [place] regarding referral.

## 2019-02-12 NOTE — TELEPHONE ENCOUNTER
----- Message from Sindhu Avilez MD sent at 2/11/2019  3:35 PM EST -----  Her labs are consistent with celiac disease.    To go on a gluten-free diet.  This will help her iron deficiency anemia.    We need to refer her to [facility] in nutrition, [place]-- can you help with this, thanks    Office follow up with me in May/June

## 2019-02-28 ENCOUNTER — OFFICE VISIT (OUTPATIENT)
Dept: OBSTETRICS AND GYNECOLOGY | Facility: CLINIC | Age: 49
End: 2019-02-28

## 2019-02-28 VITALS
SYSTOLIC BLOOD PRESSURE: 122 MMHG | WEIGHT: 170.8 LBS | HEIGHT: 64 IN | BODY MASS INDEX: 29.16 KG/M2 | DIASTOLIC BLOOD PRESSURE: 60 MMHG

## 2019-02-28 DIAGNOSIS — K90.0 CELIAC DISEASE: ICD-10-CM

## 2019-02-28 DIAGNOSIS — N92.0 MENORRHAGIA WITH REGULAR CYCLE: Primary | ICD-10-CM

## 2019-02-28 PROCEDURE — 58100 BIOPSY OF UTERUS LINING: CPT | Performed by: OBSTETRICS & GYNECOLOGY

## 2019-02-28 PROCEDURE — 99214 OFFICE O/P EST MOD 30 MIN: CPT | Performed by: OBSTETRICS & GYNECOLOGY

## 2019-02-28 RX ORDER — FERROUS SULFATE 325(65) MG
325 TABLET ORAL 2 TIMES DAILY WITH MEALS
Qty: 60 TABLET | Refills: 2 | Status: SHIPPED | OUTPATIENT
Start: 2019-02-28 | End: 2019-05-27 | Stop reason: SDUPTHER

## 2019-02-28 NOTE — PROGRESS NOTES
Subjective    Chief Complaint   Patient presents with   • Follow-up     fu, having reg but very heavy bleeding, dx with celiac disease, hgb was down to 6.9,       History of Present Illness    Inez Olivares is a 49 y.o. female who presents for anemia with hemoglobin down to 6.9.  Patient has recently been diagnosed with celiac disease and is on a gluten-free diet.  They feel this may be part of her anemia but she does have regular menses every 28 days lasting only 4 days but extremely heavy for 2 or 3 days.  This may be a reason contributing to her anemia so we will perform an endometrial biopsy and schedule a pelvic ultrasound.  She has only been on a little over-the-counter iron.    Obstetric History:  OB History      Para Term  AB Living    3 3 3          SAB TAB Ectopic Molar Multiple Live Births                        Menstrual History:     No LMP recorded.       Past Medical History:   Diagnosis Date   • Anemia    • Asthma     ALLERGY RELATED   • Celiac disease    • PONV (postoperative nausea and vomiting)      Family History   Problem Relation Age of Onset   • Uterine cancer Mother    • Heart attack Mother    • Hypertension Mother    • Depression Mother    • Arthritis Mother    • Cataracts Mother    • Alcohol abuse Father    • COPD Father         smoker   • Depression Sister    • Lung disease Maternal Grandmother    • Stroke Paternal Grandmother    • Malig Hyperthermia Neg Hx        The following portions of the patient's history were reviewed and updated as appropriate: allergies, current medications, past family history, past surgical history and problem list.    Review of Systems  As per HPI        Objective   Physical Exam  Vagina clear.  Cervix without lesion or bleeding.  Uterus normal size and shape and anteverted.  Adnexa negative.  Endometrial biopsy then performed after grasping the anterior lip of the cervix with tenaculum and dilating endocervical canal.  Biopsy performed twice  "without problem.  Patient tolerated procedure well.  Uterus was sounded to 7 cm.  /60   Ht 162.6 cm (64\")   Wt 77.5 kg (170 lb 12.8 oz)   BMI 29.32 kg/m²     Assessment/Plan   Inez was seen today for follow-up.    Diagnoses and all orders for this visit:    Menorrhagia with regular cycle  -     CBC & Differential  -     Endometrial Biopsy  -     Reference Histopathology    Celiac disease        IMP/PLAN   30-minute visit today of which 20 minutes was face-to-face counseling concerning menorrhagia and its workup and its possible contributing factor to her anemia.  We discussed endometrial ablation in detail and patient will look up NovaSure ablation while we were awaiting her pathology from her endometrial biopsy and/or ultrasound exam.  Although this may not be the only reason for her anemia, it may in fact be contributing to it.  I am also starting her on iron sulfate 325 mg twice a day with meals.               "

## 2019-03-02 LAB
BASOPHILS # BLD AUTO: 0.04 10*3/MM3 (ref 0–0.2)
BASOPHILS NFR BLD AUTO: 0.7 % (ref 0–1.5)
EOSINOPHIL # BLD AUTO: 0.15 10*3/MM3 (ref 0–0.4)
EOSINOPHIL NFR BLD AUTO: 2.6 % (ref 0.3–6.2)
ERYTHROCYTE [DISTWIDTH] IN BLOOD BY AUTOMATED COUNT: 16.7 % (ref 12.3–15.4)
HCT VFR BLD AUTO: 43.3 % (ref 34–46.6)
HGB BLD-MCNC: 12.1 G/DL (ref 12–15.9)
IMM GRANULOCYTES # BLD AUTO: 0 10*3/MM3 (ref 0–0.05)
IMM GRANULOCYTES NFR BLD AUTO: 0 % (ref 0–0.5)
LYMPHOCYTES # BLD AUTO: 1.28 10*3/MM3 (ref 0.7–3.1)
LYMPHOCYTES NFR BLD AUTO: 22.4 % (ref 19.6–45.3)
MCH RBC QN AUTO: 25.5 PG (ref 26.6–33)
MCHC RBC AUTO-ENTMCNC: 27.9 G/DL (ref 31.5–35.7)
MCV RBC AUTO: 91.4 FL (ref 79–97)
MONOCYTES # BLD AUTO: 0.48 10*3/MM3 (ref 0.1–0.9)
MONOCYTES NFR BLD AUTO: 8.4 % (ref 5–12)
NEUTROPHILS # BLD AUTO: 3.77 10*3/MM3 (ref 1.4–7)
NEUTROPHILS NFR BLD AUTO: 65.9 % (ref 42.7–76)
NRBC BLD AUTO-RTO: 0 /100 WBC (ref 0–0)
PLATELET # BLD AUTO: 326 10*3/MM3 (ref 140–450)
RBC # BLD AUTO: 4.74 10*6/MM3 (ref 3.77–5.28)
WBC # BLD AUTO: 5.72 10*3/MM3 (ref 3.4–10.8)

## 2019-03-05 ENCOUNTER — TELEPHONE (OUTPATIENT)
Dept: GASTROENTEROLOGY | Facility: CLINIC | Age: 49
End: 2019-03-05

## 2019-03-05 ENCOUNTER — PROCEDURE VISIT (OUTPATIENT)
Dept: OBSTETRICS AND GYNECOLOGY | Facility: CLINIC | Age: 49
End: 2019-03-05

## 2019-03-05 DIAGNOSIS — D21.9 FIBROID: ICD-10-CM

## 2019-03-05 DIAGNOSIS — N92.0 MENORRHAGIA WITH REGULAR CYCLE: ICD-10-CM

## 2019-03-05 LAB
PATH REPORT.FINAL DX SPEC: NORMAL
PATH REPORT.GROSS SPEC: NORMAL
PATH REPORT.SITE OF ORIGIN SPEC: NORMAL
PATHOLOGIST NAME: NORMAL
PAYMENT PROCEDURE: NORMAL

## 2019-03-05 PROCEDURE — 76830 TRANSVAGINAL US NON-OB: CPT | Performed by: OBSTETRICS & GYNECOLOGY

## 2019-03-05 NOTE — TELEPHONE ENCOUNTER
Regarding: Non-Urgent Medical Question  Contact: [phone]  ----- Message from Mychart, Generic sent at 3/5/2019  9:50 AM EST -----    I made an appointment with the dietician you recommended but can't remember her name. Would you send me her information again please?  thank you

## 2019-04-01 ENCOUNTER — TELEPHONE (OUTPATIENT)
Dept: OBSTETRICS AND GYNECOLOGY | Facility: CLINIC | Age: 49
End: 2019-04-01

## 2019-04-01 DIAGNOSIS — D22.9 MULTIPLE NEVI: Primary | ICD-10-CM

## 2019-04-01 NOTE — TELEPHONE ENCOUNTER
Tootie I am not  really sure what  she is seeing the dermatologist for it may be a vaginal issue but you can give her another referral but will need to find out first what her problem has been as it is not readily available in the chart. ? Lichen Sclerosis.  Thank you.  MACKENZIE   89 y/o F with R 11th rib fx, L1-L3 TP fx, R flank hematoma, hemodynamically nl, neurologically intact  -Admit to Trauma Dr. César Wu  -Pain control, Lidoderm, PICC protocol for rib fx  -Orthospine for Lumbar TP fx, U/A to r/o possible ureteral injury  -MOnitor R flank hematoma, repeat H/H  -NPO, will  -Restart ASA when clear from orthospine no plan for surgery

## 2019-04-01 NOTE — TELEPHONE ENCOUNTER
PT called, needs new referral for Dermatology. PT sees Dr.Ashley De Paz. Fax # [phone] States previous referral

## 2019-05-28 RX ORDER — FERROUS SULFATE 325(65) MG
TABLET ORAL
Qty: 60 TABLET | Refills: 2 | Status: SHIPPED | OUTPATIENT
Start: 2019-05-28 | End: 2021-01-05

## 2019-06-17 ENCOUNTER — OFFICE VISIT (OUTPATIENT)
Dept: GASTROENTEROLOGY | Facility: CLINIC | Age: 49
End: 2019-06-17

## 2019-06-17 VITALS
SYSTOLIC BLOOD PRESSURE: 118 MMHG | DIASTOLIC BLOOD PRESSURE: 74 MMHG | TEMPERATURE: 97.9 F | WEIGHT: 170 LBS | BODY MASS INDEX: 29.02 KG/M2 | HEIGHT: 64 IN

## 2019-06-17 DIAGNOSIS — K90.0 CELIAC DISEASE: ICD-10-CM

## 2019-06-17 DIAGNOSIS — D50.8 OTHER IRON DEFICIENCY ANEMIA: Primary | ICD-10-CM

## 2019-06-17 LAB
FERRITIN SERPL-MCNC: 24.9 NG/ML (ref 13–150)
HCT VFR BLD AUTO: 41.8 % (ref 34–46.6)
HGB BLD-MCNC: 13 G/DL (ref 12–15.9)

## 2019-06-17 PROCEDURE — 99213 OFFICE O/P EST LOW 20 MIN: CPT | Performed by: INTERNAL MEDICINE

## 2019-06-17 NOTE — PATIENT INSTRUCTIONS
Labs today    Continue gluten free diet    For any additional questions, concerns or changes to your condition after today's office visit please contact the office at [phone].

## 2019-06-17 NOTE — PROGRESS NOTES
Chief Complaint   Patient presents with   • Follow-up   • Celiac Disease   • Anemia     Subjective     HPI  Inez Olivares is a 49 y.o. female who presents for follow up of iron deficiency anemia and celiac disease that was diagnosed on her endoscopy in January.    She has seen a dietitian.  She feels that she is doing well with avoiding gluten.    She feels better.  Energy levels are better.  BMs are more regular.  Joint aches are better.    Still taking iron about every other day.    Past Medical History:   Diagnosis Date   • Anemia    • Asthma     ALLERGY RELATED   • Celiac disease    • PONV (postoperative nausea and vomiting)        Social History     Socioeconomic History   • Marital status:      Spouse name: Not on file   • Number of children: Not on file   • Years of education: Not on file   • Highest education level: Not on file   Tobacco Use   • Smoking status: Never Smoker   • Smokeless tobacco: Never Used   Substance and Sexual Activity   • Alcohol use: Yes     Types: 1 Glasses of wine per week     Comment: occ   • Drug use: No   • Sexual activity: Yes     Partners: Male     Birth control/protection: None     Comment: lmp 3/5/16         Current Outpatient Medications:   •  ferrous sulfate 325 (65 FE) MG tablet, TAKE 1 TABLET BY MOUTH TWICE A DAY WITH MEALS (Patient taking differently: TAKE 1 TABLET BY MOUTH ONCE A DAY WITH MEALS), Disp: 60 tablet, Rfl: 2  •  ibuprofen (ADVIL,MOTRIN) 200 MG tablet, Take 200 mg by mouth Every 6 (Six) Hours As Needed for Mild Pain ., Disp: , Rfl:   •  Multiple Vitamins-Minerals (MULTIVITAMIN ADULT PO), Take 1 tablet by mouth Daily., Disp: , Rfl:   •  IRON-VITAMINS PO, Take 1 tablet by mouth Daily., Disp: , Rfl:     Review of Systems   Constitutional: Negative for activity change, appetite change, chills and fever.   HENT: Negative for trouble swallowing.    Respiratory: Negative.    Cardiovascular: Negative.  Negative for chest pain.   Gastrointestinal: Negative for  abdominal distention, abdominal pain, anal bleeding, constipation, diarrhea, nausea and vomiting.   Genitourinary: Negative for dysuria, frequency and hematuria.       Objective   Vitals:    06/17/19 1020   BP: 118/74   Temp: 97.9 °F (36.6 °C)         06/17/19  1020   Weight: 77.1 kg (170 lb)     Body mass index is 29.18 kg/m².      Physical Exam   Constitutional: She is oriented to person, place, and time. She appears well-developed and well-nourished. No distress.   HENT:   Head: Normocephalic and atraumatic.   Right Ear: External ear normal.   Left Ear: External ear normal.   Nose: Nose normal.   Mouth/Throat: Oropharynx is clear and moist.   Eyes: Conjunctivae and EOM are normal. Right eye exhibits no discharge. Left eye exhibits no discharge. No scleral icterus.   Neck: Normal range of motion. Neck supple. No thyromegaly present.   No supraclavicular adenopathy   Cardiovascular: Normal rate, regular rhythm, normal heart sounds and intact distal pulses. Exam reveals no gallop.   No murmur heard.  No lower extremity edema   Pulmonary/Chest: Effort normal and breath sounds normal. No respiratory distress. She has no wheezes.   Abdominal: Soft. Normal appearance and bowel sounds are normal. She exhibits no distension and no mass. There is no hepatosplenomegaly. There is no tenderness. There is no rigidity, no rebound and no guarding. No hernia.   Genitourinary:   Genitourinary Comments: Rectal exam deferred   Musculoskeletal: Normal range of motion. She exhibits no edema or tenderness.   No atrophy of upper or lower extremities.  Normal digits and nails of both hands.   Lymphadenopathy:     She has no cervical adenopathy.   Neurological: She is alert and oriented to person, place, and time. She displays no atrophy. Coordination normal.   Skin: Skin is warm and dry. No rash noted. She is not diaphoretic. No erythema.   Psychiatric: She has a normal mood and affect. Her behavior is normal. Judgment and thought  content normal.   Vitals reviewed.      WBC   Date Value Ref Range Status   02/28/2019 5.72 3.40 - 10.80 10*3/mm3 Final     RBC   Date Value Ref Range Status   02/28/2019 4.74 3.77 - 5.28 10*6/mm3 Final     Hemoglobin   Date Value Ref Range Status   02/28/2019 12.1 12.0 - 15.9 g/dL Final   03/24/2016 11.4 (L) 11.9 - 15.5 g/dL Final     Hematocrit   Date Value Ref Range Status   02/28/2019 43.3 34.0 - 46.6 % Final   03/24/2016 35.3 (L) 35.6 - 45.5 % Final     MCV   Date Value Ref Range Status   02/28/2019 91.4 79.0 - 97.0 fL Final   03/24/2016 82.7 80.5 - 98.2 fL Final     MCH   Date Value Ref Range Status   02/28/2019 25.5 (L) 26.6 - 33.0 pg Final   03/24/2016 26.7 (L) 26.9 - 32.7 pg Final     MCHC   Date Value Ref Range Status   02/28/2019 27.9 (L) 31.5 - 35.7 g/dL Final   03/24/2016 32.3 (L) 32.4 - 36.3 g/dL Final     RDW   Date Value Ref Range Status   02/28/2019 16.7 (H) 12.3 - 15.4 % Final   03/24/2016 12.7 11.7 - 13.0 % Final     RDW-SD   Date Value Ref Range Status   03/24/2016 38.2 37.0 - 54.0 fl Final     MPV   Date Value Ref Range Status   03/24/2016 9.3 6.0 - 12.0 fL Final     Platelets   Date Value Ref Range Status   02/28/2019 326 140 - 450 10*3/mm3 Final   03/24/2016 229 140 - 500 10*3/mm3 Final     Neutrophil Rel %   Date Value Ref Range Status   02/28/2019 65.9 42.7 - 76.0 % Final     Neutrophil %   Date Value Ref Range Status   03/24/2016 63.9 42.7 - 76.0 % Final     Lymphocyte Rel %   Date Value Ref Range Status   02/28/2019 22.4 19.6 - 45.3 % Final     Lymphocyte %   Date Value Ref Range Status   03/24/2016 24.6 19.6 - 45.3 % Final     Monocyte Rel %   Date Value Ref Range Status   02/28/2019 8.4 5.0 - 12.0 % Final     Monocyte %   Date Value Ref Range Status   03/24/2016 9.3 5.0 - 12.0 % Final     Eosinophil Rel %   Date Value Ref Range Status   02/28/2019 2.6 0.3 - 6.2 % Final     Eosinophil %   Date Value Ref Range Status   03/24/2016 1.8 0.3 - 6.2 % Final     Basophil Rel %   Date Value Ref  Range Status   02/28/2019 0.7 0.0 - 1.5 % Final     Basophil %   Date Value Ref Range Status   03/24/2016 0.4 0.0 - 1.5 % Final     Immature Grans %   Date Value Ref Range Status   03/24/2016 0.0 0.0 - 0.5 % Final     Neutrophils Absolute   Date Value Ref Range Status   02/28/2019 3.77 1.40 - 7.00 10*3/mm3 Final     Neutrophils, Absolute   Date Value Ref Range Status   03/24/2016 3.57 1.90 - 8.10 10*3/mm3 Final     Lymphocytes Absolute   Date Value Ref Range Status   02/28/2019 1.28 0.70 - 3.10 10*3/mm3 Final     Lymphocytes, Absolute   Date Value Ref Range Status   03/24/2016 1.37 0.90 - 4.80 10*3/mm3 Final     Monocytes Absolute   Date Value Ref Range Status   02/28/2019 0.48 0.10 - 0.90 10*3/mm3 Final     Monocytes, Absolute   Date Value Ref Range Status   03/24/2016 0.52 0.20 - 1.20 10*3/mm3 Final     Eosinophils Absolute   Date Value Ref Range Status   02/28/2019 0.15 0.00 - 0.40 10*3/mm3 Final     Eosinophils, Absolute   Date Value Ref Range Status   03/24/2016 0.10 0.00 - 0.70 10*3/mm3 Final     Basophils Absolute   Date Value Ref Range Status   02/28/2019 0.04 0.00 - 0.20 10*3/mm3 Final     Basophils, Absolute   Date Value Ref Range Status   03/24/2016 0.02 0.00 - 0.20 10*3/mm3 Final     Immature Grans, Absolute   Date Value Ref Range Status   03/24/2016 0.00 0.00 - 0.03 10*3/mm3 Final     nRBC   Date Value Ref Range Status   02/28/2019 0.0 0.0 - 0.0 /100 WBC Final       Lab Results   Component Value Date    BUN 17 12/13/2018    CREATININE 0.68 12/13/2018    EGFRIFNONA 92 12/13/2018    EGFRIFAFRI 112 12/13/2018    BCR 25.0 12/13/2018    CO2 28.0 12/13/2018    CALCIUM 9.2 12/13/2018    PROTENTOTREF 6.2 12/13/2018    ALBUMIN 4.10 12/13/2018    LABIL2 2.0 12/13/2018    AST 19 12/13/2018    ALT 20 12/13/2018         Imaging Results (last 7 days)     ** No results found for the last 168 hours. **            Assessment/Plan    Celiac disease: Her anti-tissue transglutaminase IgA was greater than 100    Iron  deficiency anemia: Likely with above, she has still been on iron supplementation    Plan  check her hemoglobin hematocrit and ferritin today along with her tissue transglutaminase IgA    Continue gluten-free diet    Hopefully, she will be able to stop iron supplementation    Inez was seen today for follow-up, celiac disease and anemia.    Diagnoses and all orders for this visit:    Other iron deficiency anemia  -     Ferritin  -     Tissue Transglutaminase, IgA  -     Hemoglobin & Hematocrit, Blood    Celiac disease  -     Ferritin  -     Tissue Transglutaminase, IgA  -     Hemoglobin & Hematocrit, Blood        Dictated utilizing Dragon dictation

## 2019-06-18 LAB — TTG IGA SER-ACNC: 15 U/ML (ref 0–3)

## 2019-06-20 NOTE — PROGRESS NOTES
Celiac antibodies are down to 15, at her diagnosis they were >100 (lower is better).    Iron stores are better but still need a little improvement so I'd like her to continue the iron for now.    Blood count is stable.    Continue gluten free diet, she is doing well.

## 2019-06-21 ENCOUNTER — TELEPHONE (OUTPATIENT)
Dept: GASTROENTEROLOGY | Facility: CLINIC | Age: 49
End: 2019-06-21

## 2019-06-21 NOTE — TELEPHONE ENCOUNTER
----- Message from Sindhu Avilez MD sent at 6/20/2019  4:41 PM EDT -----  Celiac antibodies are down to 15, at her diagnosis they were >100 (lower is better).    Iron stores are better but still need a little improvement so I'd like her to continue the iron for now.    Blood count is stable.    Continue gluten free diet, she is doing well.

## 2019-06-24 NOTE — TELEPHONE ENCOUNTER
Called pt and advised per Dr Avilez that her celiac antibodies are down to 15 at her diagnosis they were > 100 (lower is better).     Iron stores are better but still need a little improvement so she would like her to continue the iron for now.     Blood count is stable.       Continue gluten free diet, she is doing well.  Pt verb understanding.

## 2019-07-08 ENCOUNTER — TELEPHONE (OUTPATIENT)
Dept: OBSTETRICS AND GYNECOLOGY | Facility: CLINIC | Age: 49
End: 2019-07-08

## 2019-07-08 NOTE — TELEPHONE ENCOUNTER
Pt called, concerned about her periods. Pt states she has always had predictable periods, and knows exactly when she would start. Pt states that she skipped the month of June and then had a period last week that was extremely heavy, pt stated she went through a box of tampons in 3 days. Please advise.      Pt callback: [phone]

## 2019-07-09 NOTE — TELEPHONE ENCOUNTER
Patient was a little concerned because she had a heavier period and   Sometimes skips.  In February she had a negative endometrial biopsy and an ultrasound which showed a rather thin lining with a small fibroid.  She has an appointment next month and will keep that.  I have explained that if her bleeding gets too heavy we can always do a procedure such as an ablation but that does not. Seem  Indicated as of yet.  Patient is happy with waiting and seeing what will develop as far as her menses are concerned as she goes into the menopause.  MACKENZIE

## 2019-07-18 ENCOUNTER — OFFICE VISIT (OUTPATIENT)
Dept: RETAIL CLINIC | Facility: CLINIC | Age: 49
End: 2019-07-18

## 2019-07-18 VITALS
TEMPERATURE: 98.4 F | DIASTOLIC BLOOD PRESSURE: 82 MMHG | OXYGEN SATURATION: 98 % | HEART RATE: 74 BPM | SYSTOLIC BLOOD PRESSURE: 128 MMHG | RESPIRATION RATE: 18 BRPM

## 2019-07-18 DIAGNOSIS — N30.00 ACUTE CYSTITIS WITHOUT HEMATURIA: Primary | ICD-10-CM

## 2019-07-18 LAB
BILIRUB BLD-MCNC: NEGATIVE MG/DL
CLARITY, POC: CLEAR
COLOR UR: YELLOW
GLUCOSE UR STRIP-MCNC: NEGATIVE MG/DL
KETONES UR QL: NEGATIVE
LEUKOCYTE EST, POC: ABNORMAL
NITRITE UR-MCNC: POSITIVE MG/ML
PH UR: 7 [PH] (ref 5–8)
PROT UR STRIP-MCNC: NEGATIVE MG/DL
RBC # UR STRIP: NEGATIVE /UL
SP GR UR: 1 (ref 1–1.03)
UROBILINOGEN UR QL: NORMAL

## 2019-07-18 PROCEDURE — 81003 URINALYSIS AUTO W/O SCOPE: CPT | Performed by: NURSE PRACTITIONER

## 2019-07-18 PROCEDURE — 99213 OFFICE O/P EST LOW 20 MIN: CPT | Performed by: NURSE PRACTITIONER

## 2019-07-18 RX ORDER — NITROFURANTOIN 25; 75 MG/1; MG/1
100 CAPSULE ORAL 2 TIMES DAILY
Qty: 10 CAPSULE | Refills: 0 | Status: SHIPPED | OUTPATIENT
Start: 2019-07-18 | End: 2019-07-23

## 2019-07-18 NOTE — PROGRESS NOTES
"Subjective   Inez Olivares is a 49 y.o. female.     Urinary Tract Infection    This is a new problem. The current episode started yesterday. The problem has been gradually worsening. The quality of the pain is described as burning (\"lots of burning\"). The patient is experiencing no pain. There has been no fever. She is sexually active. There is no history of pyelonephritis. Associated symptoms include frequency. Pertinent negatives include no flank pain, hematuria, nausea or vomiting. She has tried nothing for the symptoms. Her past medical history is significant for recurrent UTIs. not had any in \"quite some time\"        The following portions of the patient's history were reviewed and updated as appropriate: allergies, current medications, past family history, past medical history, past social history, past surgical history and problem list.    Review of Systems   HENT: Negative.    Cardiovascular: Negative.    Gastrointestinal: Negative.  Negative for nausea and vomiting.   Genitourinary: Positive for frequency. Negative for flank pain and hematuria.   Neurological: Negative.        Objective   Physical Exam   Constitutional: She is cooperative. No distress.   HENT:   Head: Normocephalic.   Right Ear: Hearing, tympanic membrane, external ear and ear canal normal.   Left Ear: Hearing, tympanic membrane, external ear and ear canal normal.   Nose: Nose normal.   Mouth/Throat: Oropharynx is clear and moist.   Eyes: Conjunctivae, EOM and lids are normal. Pupils are equal, round, and reactive to light.   Neck: Trachea normal and full passive range of motion without pain.   Cardiovascular: Normal rate, regular rhythm and normal pulses.   Pulmonary/Chest: Effort normal and breath sounds normal.   Abdominal: There is no tenderness. There is no CVA tenderness.   Neurological: She is alert.   Skin: Skin is warm. Capillary refill takes less than 2 seconds.   Psychiatric: She has a normal mood and affect. Her speech is " normal and behavior is normal.   Vitals reviewed.        Assessment/Plan   Inez was seen today for urinary tract infection.    Diagnoses and all orders for this visit:    Acute cystitis without hematuria  -     POC Urinalysis Dipstick, Automated    Other orders  -     nitrofurantoin, macrocrystal-monohydrate, (MACROBID) 100 MG capsule; Take 1 capsule by mouth 2 (Two) Times a Day for 5 days.

## 2019-07-23 ENCOUNTER — TELEPHONE (OUTPATIENT)
Dept: RETAIL CLINIC | Facility: CLINIC | Age: 49
End: 2019-07-23

## 2019-07-23 RX ORDER — SULFAMETHOXAZOLE AND TRIMETHOPRIM 800; 160 MG/1; MG/1
1 TABLET ORAL 2 TIMES DAILY
Qty: 10 TABLET | Refills: 0 | Status: SHIPPED | OUTPATIENT
Start: 2019-07-23 | End: 2019-07-28

## 2019-07-23 NOTE — TELEPHONE ENCOUNTER
Pt called and said that her UTI symptoms are the same as last Thursday with minimal improvement and today is last day for antibiotics.  She does have appt with gyn next week.  Will start her on bactrim and instructed to follow up with OB.  No blood noted in her urine, no fever, no back pain.

## 2019-08-01 ENCOUNTER — PROCEDURE VISIT (OUTPATIENT)
Dept: OBSTETRICS AND GYNECOLOGY | Facility: CLINIC | Age: 49
End: 2019-08-01

## 2019-08-01 ENCOUNTER — OFFICE VISIT (OUTPATIENT)
Dept: OBSTETRICS AND GYNECOLOGY | Facility: CLINIC | Age: 49
End: 2019-08-01

## 2019-08-01 ENCOUNTER — APPOINTMENT (OUTPATIENT)
Dept: WOMENS IMAGING | Facility: HOSPITAL | Age: 49
End: 2019-08-01

## 2019-08-01 VITALS
BODY MASS INDEX: 28.68 KG/M2 | DIASTOLIC BLOOD PRESSURE: 70 MMHG | WEIGHT: 168 LBS | HEIGHT: 64 IN | SYSTOLIC BLOOD PRESSURE: 114 MMHG

## 2019-08-01 DIAGNOSIS — R39.9 URINARY TRACT INFECTION SYMPTOMS: ICD-10-CM

## 2019-08-01 DIAGNOSIS — Z01.419 ENCOUNTER FOR GYNECOLOGICAL EXAMINATION WITHOUT ABNORMAL FINDING: Primary | ICD-10-CM

## 2019-08-01 DIAGNOSIS — Z12.31 VISIT FOR SCREENING MAMMOGRAM: Primary | ICD-10-CM

## 2019-08-01 DIAGNOSIS — Z12.39 SCREENING FOR BREAST CANCER: ICD-10-CM

## 2019-08-01 PROCEDURE — 77067 SCR MAMMO BI INCL CAD: CPT | Performed by: OBSTETRICS & GYNECOLOGY

## 2019-08-01 PROCEDURE — 77067 SCR MAMMO BI INCL CAD: CPT | Performed by: RADIOLOGY

## 2019-08-01 PROCEDURE — 99396 PREV VISIT EST AGE 40-64: CPT | Performed by: OBSTETRICS & GYNECOLOGY

## 2019-08-01 NOTE — PROGRESS NOTES
Subjective    Chief Complaint   Patient presents with   • Gynecologic Exam     AE      History of Present Illness    Inez Olivares is a 49 y.o. female who presents for annual exam.  Non-smoker.  Mammogram today.  Celiac disease doing better.  Periods are heavy but regular.  Has had an ultrasound with a small  fibroid and benign endometrial biopsy this year.  Not wanting an ablation yet.  Recently treated with 2 rounds of antibiotics for UTI so we will check a urine culture.    Obstetric History:  OB History      Para Term  AB Living    3 3 3          SAB TAB Ectopic Molar Multiple Live Births                        Menstrual History:     No LMP recorded.       Past Medical History:   Diagnosis Date   • Anemia    • Asthma     ALLERGY RELATED   • Celiac disease    • PONV (postoperative nausea and vomiting)      Family History   Problem Relation Age of Onset   • Uterine cancer Mother    • Heart attack Mother    • Hypertension Mother    • Depression Mother    • Arthritis Mother    • Cataracts Mother    • Colon polyps Mother    • Alcohol abuse Father    • COPD Father         smoker   • Depression Sister    • Lung disease Maternal Grandmother    • Stroke Paternal Grandmother    • Malig Hyperthermia Neg Hx      Social History     Tobacco Use   Smoking Status Never Smoker   Smokeless Tobacco Never Used     Counseling given: Not Answered      The following portions of the patient's history were reviewed and updated as appropriate: allergies, current medications, past family history, past medical history, past social history, past surgical history and problem list.    Review of Systems   Constitutional: Negative.  Negative for fever and unexpected weight change.   HENT: Negative.    Respiratory: Negative for shortness of breath and wheezing.    Cardiovascular: Negative for chest pain, palpitations and leg swelling.   Gastrointestinal: Negative for abdominal pain, anal bleeding and blood in stool.  "  Genitourinary: Negative for dysuria, pelvic pain, urgency, vaginal bleeding, vaginal discharge and vaginal pain.        + Heavy menses although regular   Skin: Negative.    Neurological: Negative.    Hematological: Negative.  Negative for adenopathy.   Psychiatric/Behavioral: Negative.  Negative for dysphoric mood. The patient is not nervous/anxious.             Objective   Physical Exam   Constitutional: She is oriented to person, place, and time. Vital signs are normal. She appears well-developed and well-nourished.   HENT:   Head: Normocephalic.   Neck: Trachea normal. No tracheal deviation present. No thyromegaly present.   Cardiovascular: Normal rate, regular rhythm and normal heart sounds.   No murmur heard.  Pulmonary/Chest: Effort normal and breath sounds normal.   Breasts without masses, tenderness or nipple discharge   Abdominal: Soft. Normal appearance. She exhibits no mass. There is no hepatosplenomegaly. There is no tenderness. No hernia.   Genitourinary: Rectum normal, vagina normal and uterus normal. Uterus is not enlarged and not tender. Cervix exhibits no motion tenderness. Right adnexum displays no mass and no tenderness. Left adnexum displays no mass and no tenderness. No vaginal discharge found.   Genitourinary Comments: External genitalia normal.  On menses currently   Lymphadenopathy:     She has no cervical adenopathy.     She has no axillary adenopathy.   Neurological: She is alert and oriented to person, place, and time.   Skin: Skin is warm and dry. No rash noted.   Psychiatric: She has a normal mood and affect. Her behavior is normal. Cognition and memory are normal.       /70   Ht 162.6 cm (64\")   Wt 76.2 kg (168 lb)   BMI 28.84 kg/m²     Assessment/Plan   Inez was seen today for gynecologic exam.    Diagnoses and all orders for this visit:    Encounter for gynecological examination without abnormal finding  -     IGP,rfx Aptima HPV All Pth    Screening for breast " cancer    Urinary tract infection symptoms  -     Urine Culture - Urine, Urine, Clean Catch        Mammogram.  Return to office 1 year.    Counseled about beginning thousand units of vitamin D3 as may not tolerate calcium.

## 2019-08-03 LAB
BACTERIA UR CULT: NORMAL
BACTERIA UR CULT: NORMAL

## 2019-08-05 ENCOUNTER — TELEPHONE (OUTPATIENT)
Dept: OBSTETRICS AND GYNECOLOGY | Facility: CLINIC | Age: 49
End: 2019-08-05

## 2019-08-05 LAB
CONV .: NORMAL
CYTOLOGIST CVX/VAG CYTO: NORMAL
CYTOLOGY CVX/VAG DOC CYTO: NORMAL
CYTOLOGY CVX/VAG DOC THIN PREP: NORMAL
DX ICD CODE: NORMAL
HIV 1 & 2 AB SER-IMP: NORMAL
OTHER STN SPEC: NORMAL
STAT OF ADQ CVX/VAG CYTO-IMP: NORMAL

## 2019-08-05 NOTE — TELEPHONE ENCOUNTER
----- Message from En Caballero MD sent at 8/5/2019  7:37 AM EDT -----  Sindhu please tell pt urine culture neg. Thanks MACKENZIE

## 2019-09-16 ENCOUNTER — OFFICE VISIT (OUTPATIENT)
Dept: RETAIL CLINIC | Facility: CLINIC | Age: 49
End: 2019-09-16

## 2019-09-16 VITALS
RESPIRATION RATE: 18 BRPM | OXYGEN SATURATION: 98 % | SYSTOLIC BLOOD PRESSURE: 102 MMHG | DIASTOLIC BLOOD PRESSURE: 60 MMHG | TEMPERATURE: 98.1 F | HEART RATE: 75 BPM

## 2019-09-16 DIAGNOSIS — J06.9 ACUTE URI: Primary | ICD-10-CM

## 2019-09-16 PROCEDURE — 99213 OFFICE O/P EST LOW 20 MIN: CPT | Performed by: NURSE PRACTITIONER

## 2019-09-16 RX ORDER — GUAIFENESIN 600 MG/1
1200 TABLET, EXTENDED RELEASE ORAL 2 TIMES DAILY
Qty: 12 TABLET | Refills: 0 | Status: SHIPPED | OUTPATIENT
Start: 2019-09-16 | End: 2019-09-19

## 2019-09-16 NOTE — PATIENT INSTRUCTIONS
Viral Respiratory Infection  A respiratory infection is an illness that affects part of the respiratory system, such as the lungs, nose, or throat. A respiratory infection that is caused by a virus is called a viral respiratory infection.  Common types of viral respiratory infections include:  · A cold.  · The flu (influenza).  · A respiratory syncytial virus (RSV) infection.  What are the causes?  This condition is caused by a virus.  What are the signs or symptoms?  Symptoms of this condition include:  · A stuffy or runny nose.  · Yellow or green nasal discharge.  · A cough.  · Sneezing.  · Fatigue.  · Achy muscles.  · A sore throat.  · Sweating or chills.  · A fever.  · A headache.  How is this diagnosed?  This condition may be diagnosed based on:  · Your symptoms.  · A physical exam.  · Testing of nasal swabs.  How is this treated?  This condition may be treated with medicines, such as:  · Antiviral medicine. This may shorten the length of time a person has symptoms.  · Expectorants. These make it easier to cough up mucus.  · Decongestant nasal sprays.  · Acetaminophen or NSAIDs to relieve fever and pain.  Antibiotic medicines are not prescribed for viral infections. This is because antibiotics are designed to kill bacteria. They are not effective against viruses.  Follow these instructions at home:    Managing pain and congestion  · Take over-the-counter and prescription medicines only as told by your health care provider.  · If you have a sore throat, gargle with a salt-water mixture 3-4 times a day or as needed. To make a salt-water mixture, completely dissolve ½-1 tsp of salt in 1 cup of warm water.  · Use nose drops made from salt water to ease congestion and soften raw skin around your nose.  · Drink enough fluid to keep your urine pale yellow. This helps prevent dehydration and helps loosen up mucus.  General instructions  · Rest as much as possible.  · Do not drink alcohol.  · Do not use any products  that contain nicotine or tobacco, such as cigarettes and e-cigarettes. If you need help quitting, ask your health care provider.  · Keep all follow-up visits as told by your health care provider. This is important.  How is this prevented?    · Get an annual flu shot. You may get the flu shot in late summer, fall, or winter. Ask your health care provider when you should get your flu shot.  · Avoid exposing others to your respiratory infection.  ? Stay home from work or school as told by your health care provider.  ? Wash your hands with soap and water often, especially after you cough or sneeze. If soap and water are not available, use alcohol-based hand .  · Avoid contact with people who are sick during cold and flu season. This is generally fall and winter.  Contact a health care provider if:  · Your symptoms last for 10 days or longer.  · Your symptoms get worse over time.  · You have a fever.  · You have severe sinus pain in your face or forehead.  · The glands in your jaw or neck become very swollen.  Get help right away if you:  · Feel pain or pressure in your chest.  · Have shortness of breath.  · Faint or feel like you will faint.  · Have severe and persistent vomiting.  · Feel confused or disoriented.  Summary  · A respiratory infection is an illness that affects part of the respiratory system, such as the lungs, nose, or throat. A respiratory infection that is caused by a virus is called a viral respiratory infection.  · Common types of viral respiratory infections are a cold, influenza, and respiratory syncytial virus (RSV) infection.  · Symptoms of this condition include a stuffy or runny nose, cough, sneezing, fatigue, achy muscles, sore throat, and fevers or chills.  · Antibiotic medicines are not prescribed for viral infections. This is because antibiotics are designed to kill bacteria. They are not effective against viruses.  This information is not intended to replace advice given to you by  your health care provider. Make sure you discuss any questions you have with your health care provider.  Document Released: 09/27/2006 Document Revised: 01/28/2019 Document Reviewed: 01/28/2019  Elsevier Interactive Patient Education © 2019 [employer]  Allergic Rhinitis, Adult  Allergic rhinitis is an allergic reaction that affects the mucous membrane inside the nose. It causes sneezing, a runny or stuffy nose, and the feeling of mucus going down the back of the throat (postnasal drip). Allergic rhinitis can be mild to severe.  There are two types of allergic rhinitis:  · Seasonal. This type is also called hay fever. It happens only during certain seasons.  · Perennial. This type can happen at any time of the year.  What are the causes?  This condition happens when the body's defense system (immune system) responds to certain harmless substances called allergens as though they were germs.   Seasonal allergic rhinitis is triggered by pollen, which can come from grasses, trees, and weeds. Perennial allergic rhinitis may be caused by:  · House dust mites.  · Pet dander.  · Mold spores.  What are the signs or symptoms?  Symptoms of this condition include:  · Sneezing.  · Runny or stuffy nose (nasal congestion).  · Postnasal drip.  · Itchy nose.  · Tearing of the eyes.  · Trouble sleeping.  · Daytime sleepiness.  How is this diagnosed?  This condition may be diagnosed based on:  · Your medical history.  · A physical exam.  · Tests to check for related conditions, such as:  ? Asthma.  ? Pink eye.  ? Ear infection.  ? Upper respiratory infection.  · Tests to find out which allergens trigger your symptoms. These may include skin or blood tests.  How is this treated?  There is no cure for this condition, but treatment can help control symptoms. Treatment may include:  · Taking medicines that block allergy symptoms, such as antihistamines. Medicine may be given as a shot, nasal spray, or pill.  · Avoiding the  allergen.  · Desensitization. This treatment involves getting ongoing shots until your body becomes less sensitive to the allergen. This treatment may be done if other treatments do not help.  · If taking medicine and avoiding the allergen does not work, new, stronger medicines may be prescribed.  Follow these instructions at home:  · Find out what you are allergic to. Common allergens include smoke, dust, and pollen.  · Avoid the things you are allergic to. These are some things you can do to help avoid allergens:  ? Replace carpet with wood, tile, or vinyl zeinab. Carpet can trap dander and dust.  ? Do not smoke. Do not allow smoking in your home.  ? Change your heating and air conditioning filter at least once a month.  ? During allergy season:  § Keep windows closed as much as possible.  § Plan outdoor activities when pollen counts are lowest. This is usually during the evening hours.  § When coming indoors, change clothing and shower before sitting on furniture or bedding.  · Take over-the-counter and prescription medicines only as told by your health care provider.  · Keep all follow-up visits as told by your health care provider. This is important.  Contact a health care provider if:  · You have a fever.  · You develop a persistent cough.  · You make whistling sounds when you breathe (you wheeze).  · Your symptoms interfere with your normal daily activities.  Get help right away if:  · You have shortness of breath.  Summary  · This condition can be managed by taking medicines as directed and avoiding allergens.  · Contact your health care provider if you develop a persistent cough or fever.  · During allergy season, keep windows closed as much as possible.  This information is not intended to replace advice given to you by your health care provider. Make sure you discuss any questions you have with your health care provider.  Document Released: 09/12/2002 Document Revised: 01/25/2018 Document Reviewed:  01/25/2018  Elsevier Interactive Patient Education © 2019 [employer]

## 2019-09-16 NOTE — PROGRESS NOTES
Subjective   Inez Olivares is a 49 y.o. female.     URI    This is a new problem. The current episode started in the past 7 days (2 days). The problem has been gradually worsening. There has been no fever. Associated symptoms include congestion, headaches, rhinorrhea, sinus pain and sneezing. Pertinent negatives include no coughing, ear pain, plugged ear sensation or sore throat. She has tried acetaminophen (dayquil) for the symptoms. The treatment provided mild relief.        The following portions of the patient's history were reviewed and updated as appropriate: allergies, current medications, past family history, past medical history, past social history, past surgical history and problem list.    Review of Systems   Constitutional: Negative for chills, fatigue and fever.   HENT: Positive for congestion, postnasal drip, rhinorrhea, sinus pressure and sneezing. Negative for ear pain and sore throat.    Respiratory: Negative for cough.    Cardiovascular: Negative.    Gastrointestinal: Negative.    Musculoskeletal: Negative for myalgias.       Objective   Physical Exam   Constitutional: She is oriented to person, place, and time. She appears well-developed.   HENT:   Head: Normocephalic.   Right Ear: Tympanic membrane and ear canal normal.   Left Ear: Tympanic membrane and ear canal normal.   Nose: Mucosal edema, rhinorrhea and congestion present. Right sinus exhibits no maxillary sinus tenderness and no frontal sinus tenderness. Left sinus exhibits no maxillary sinus tenderness and no frontal sinus tenderness.   Mouth/Throat: Uvula is midline, oropharynx is clear and moist and mucous membranes are normal. No tonsillar exudate.   Cardiovascular: Normal rate, regular rhythm and normal heart sounds.   Pulmonary/Chest: Effort normal and breath sounds normal.   Lymphadenopathy:     She has no cervical adenopathy.   Neurological: She is alert and oriented to person, place, and time.   Skin: Skin is warm and dry.    Psychiatric: She has a normal mood and affect.     Vitals:    09/16/19 1143   BP: 102/60   Pulse: 75   Resp: 18   Temp: 98.1 °F (36.7 °C)   TempSrc: Oral   SpO2: 98%         Assessment/Plan   Inez was seen today for uri.    Diagnoses and all orders for this visit:    Acute URI  -     Chlorcyclizine-Pseudoephed 25-60 MG tablet; Take 60 mg by mouth 2 (Two) Times a Day for 7 days.  -     guaiFENesin (MUCINEX) 600 MG 12 hr tablet; Take 2 tablets by mouth 2 (Two) Times a Day for 3 days.      Pt to follow up with PCP if symptoms worsen or fail to improve as anticipated.  Pt to take OTC Zyrtec.      Viral Respiratory Infection  A respiratory infection is an illness that affects part of the respiratory system, such as the lungs, nose, or throat. A respiratory infection that is caused by a virus is called a viral respiratory infection.  Common types of viral respiratory infections include:  · A cold.  · The flu (influenza).  · A respiratory syncytial virus (RSV) infection.  What are the causes?  This condition is caused by a virus.  What are the signs or symptoms?  Symptoms of this condition include:  · A stuffy or runny nose.  · Yellow or green nasal discharge.  · A cough.  · Sneezing.  · Fatigue.  · Achy muscles.  · A sore throat.  · Sweating or chills.  · A fever.  · A headache.  How is this diagnosed?  This condition may be diagnosed based on:  · Your symptoms.  · A physical exam.  · Testing of nasal swabs.  How is this treated?  This condition may be treated with medicines, such as:  · Antiviral medicine. This may shorten the length of time a person has symptoms.  · Expectorants. These make it easier to cough up mucus.  · Decongestant nasal sprays.  · Acetaminophen or NSAIDs to relieve fever and pain.  Antibiotic medicines are not prescribed for viral infections. This is because antibiotics are designed to kill bacteria. They are not effective against viruses.  Follow these instructions at home:    Managing pain and  congestion  · Take over-the-counter and prescription medicines only as told by your health care provider.  · If you have a sore throat, gargle with a salt-water mixture 3-4 times a day or as needed. To make a salt-water mixture, completely dissolve ½-1 tsp of salt in 1 cup of warm water.  · Use nose drops made from salt water to ease congestion and soften raw skin around your nose.  · Drink enough fluid to keep your urine pale yellow. This helps prevent dehydration and helps loosen up mucus.  General instructions  · Rest as much as possible.  · Do not drink alcohol.  · Do not use any products that contain nicotine or tobacco, such as cigarettes and e-cigarettes. If you need help quitting, ask your health care provider.  · Keep all follow-up visits as told by your health care provider. This is important.  How is this prevented?    · Get an annual flu shot. You may get the flu shot in late summer, fall, or winter. Ask your health care provider when you should get your flu shot.  · Avoid exposing others to your respiratory infection.  ? Stay home from work or school as told by your health care provider.  ? Wash your hands with soap and water often, especially after you cough or sneeze. If soap and water are not available, use alcohol-based hand .  · Avoid contact with people who are sick during cold and flu season. This is generally fall and winter.  Contact a health care provider if:  · Your symptoms last for 10 days or longer.  · Your symptoms get worse over time.  · You have a fever.  · You have severe sinus pain in your face or forehead.  · The glands in your jaw or neck become very swollen.  Get help right away if you:  · Feel pain or pressure in your chest.  · Have shortness of breath.  · Faint or feel like you will faint.  · Have severe and persistent vomiting.  · Feel confused or disoriented.  Summary  · A respiratory infection is an illness that affects part of the respiratory system, such as the  lungs, nose, or throat. A respiratory infection that is caused by a virus is called a viral respiratory infection.  · Common types of viral respiratory infections are a cold, influenza, and respiratory syncytial virus (RSV) infection.  · Symptoms of this condition include a stuffy or runny nose, cough, sneezing, fatigue, achy muscles, sore throat, and fevers or chills.  · Antibiotic medicines are not prescribed for viral infections. This is because antibiotics are designed to kill bacteria. They are not effective against viruses.  This information is not intended to replace advice given to you by your health care provider. Make sure you discuss any questions you have with your health care provider.  Document Released: 09/27/2006 Document Revised: 01/28/2019 Document Reviewed: 01/28/2019  [employer] Interactive Patient Education © 2019 [employer]    Allergic Rhinitis, Adult  Allergic rhinitis is an allergic reaction that affects the mucous membrane inside the nose. It causes sneezing, a runny or stuffy nose, and the feeling of mucus going down the back of the throat (postnasal drip). Allergic rhinitis can be mild to severe.  There are two types of allergic rhinitis:  · Seasonal. This type is also called hay fever. It happens only during certain seasons.  · Perennial. This type can happen at any time of the year.  What are the causes?  This condition happens when the body's defense system (immune system) responds to certain harmless substances called allergens as though they were germs.   Seasonal allergic rhinitis is triggered by pollen, which can come from grasses, trees, and weeds. Perennial allergic rhinitis may be caused by:  · House dust mites.  · Pet dander.  · Mold spores.  What are the signs or symptoms?  Symptoms of this condition include:  · Sneezing.  · Runny or stuffy nose (nasal congestion).  · Postnasal drip.  · Itchy nose.  · Tearing of the eyes.  · Trouble sleeping.  · Daytime sleepiness.  How is  this diagnosed?  This condition may be diagnosed based on:  · Your medical history.  · A physical exam.  · Tests to check for related conditions, such as:  ? Asthma.  ? Pink eye.  ? Ear infection.  ? Upper respiratory infection.  · Tests to find out which allergens trigger your symptoms. These may include skin or blood tests.  How is this treated?  There is no cure for this condition, but treatment can help control symptoms. Treatment may include:  · Taking medicines that block allergy symptoms, such as antihistamines. Medicine may be given as a shot, nasal spray, or pill.  · Avoiding the allergen.  · Desensitization. This treatment involves getting ongoing shots until your body becomes less sensitive to the allergen. This treatment may be done if other treatments do not help.  · If taking medicine and avoiding the allergen does not work, new, stronger medicines may be prescribed.  Follow these instructions at home:  · Find out what you are allergic to. Common allergens include smoke, dust, and pollen.  · Avoid the things you are allergic to. These are some things you can do to help avoid allergens:  ? Replace carpet with wood, tile, or vinyl zeinab. Carpet can trap dander and dust.  ? Do not smoke. Do not allow smoking in your home.  ? Change your heating and air conditioning filter at least once a month.  ? During allergy season:  § Keep windows closed as much as possible.  § Plan outdoor activities when pollen counts are lowest. This is usually during the evening hours.  § When coming indoors, change clothing and shower before sitting on furniture or bedding.  · Take over-the-counter and prescription medicines only as told by your health care provider.  · Keep all follow-up visits as told by your health care provider. This is important.  Contact a health care provider if:  · You have a fever.  · You develop a persistent cough.  · You make whistling sounds when you breathe (you wheeze).  · Your symptoms  interfere with your normal daily activities.  Get help right away if:  · You have shortness of breath.  Summary  · This condition can be managed by taking medicines as directed and avoiding allergens.  · Contact your health care provider if you develop a persistent cough or fever.  · During allergy season, keep windows closed as much as possible.  This information is not intended to replace advice given to you by your health care provider. Make sure you discuss any questions you have with your health care provider.  Document Released: 09/12/2002 Document Revised: 01/25/2018 Document Reviewed: 01/25/2018  [employer] Interactive Patient Education © 2019 [employer]

## 2020-02-03 ENCOUNTER — OFFICE VISIT (OUTPATIENT)
Dept: OBSTETRICS AND GYNECOLOGY | Facility: CLINIC | Age: 50
End: 2020-02-03

## 2020-02-03 VITALS
HEIGHT: 64 IN | BODY MASS INDEX: 28.68 KG/M2 | DIASTOLIC BLOOD PRESSURE: 75 MMHG | WEIGHT: 168 LBS | SYSTOLIC BLOOD PRESSURE: 110 MMHG

## 2020-02-03 DIAGNOSIS — N64.4 BREAST PAIN, RIGHT: Primary | ICD-10-CM

## 2020-02-03 PROCEDURE — 99213 OFFICE O/P EST LOW 20 MIN: CPT | Performed by: OBSTETRICS & GYNECOLOGY

## 2020-02-03 NOTE — PROGRESS NOTES
"Subjective    Chief Complaint   Patient presents with   • Follow-up     Breast check       History of Present Illness    Inez Olivares is a 50 y.o. female who presents for breast check.  Mammogram 2019 was normal.  Patient has noted some new onset of tenderness in the upper and lower lower outer quadrants of the right breast.  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    Obstetric History:  OB History        3    Para   3    Term   3            AB        Living           SAB        TAB        Ectopic        Molar        Multiple        Live Births                   Menstrual History:     No LMP recorded.       Past Medical History:   Diagnosis Date   • Anemia    • Asthma     ALLERGY RELATED   • Celiac disease    • PONV (postoperative nausea and vomiting)      Family History   Problem Relation Age of Onset   • Uterine cancer Mother    • Heart attack Mother    • Hypertension Mother    • Depression Mother    • Arthritis Mother    • Cataracts Mother    • Colon polyps Mother    • Alcohol abuse Father    • COPD Father         smoker   • Depression Sister    • Lung disease Maternal Grandmother    • Stroke Paternal Grandmother    • Malig Hyperthermia Neg Hx        The following portions of the patient's history were reviewed and updated as appropriate: allergies, current medications, past family history, past medical history, past social history, past surgical history and problem list.    Review of Systems  Positive only for right breast tenderness.       Objective   Physical Exam  Examination of left breast totally negative for tenderness or mass.  Examination of right breast demonstrates no nipple discharge or mass.  There is some minimal tenderness in the outer quadrants which may just be fibrocystic change but due to concern by the patient will get a mammogram targeted and ultrasound.  /75   Ht 162.6 cm (64\")   Wt 76.2 kg (168 lb)   BMI 28.84 kg/m² "     Assessment/Plan   Inez was seen today for follow-up.    Diagnoses and all orders for this visit:    Breast pain, right        Scheduling diagnostic right breast mammogram and right breast ultrasound.

## 2020-02-12 ENCOUNTER — APPOINTMENT (OUTPATIENT)
Dept: WOMENS IMAGING | Facility: HOSPITAL | Age: 50
End: 2020-02-12

## 2020-02-12 PROCEDURE — G0279 TOMOSYNTHESIS, MAMMO: HCPCS | Performed by: RADIOLOGY

## 2020-02-12 PROCEDURE — 77065 DX MAMMO INCL CAD UNI: CPT | Performed by: RADIOLOGY

## 2020-02-12 PROCEDURE — 77061 BREAST TOMOSYNTHESIS UNI: CPT | Performed by: RADIOLOGY

## 2020-02-24 ENCOUNTER — TELEPHONE (OUTPATIENT)
Dept: OBSTETRICS AND GYNECOLOGY | Facility: CLINIC | Age: 50
End: 2020-02-24

## 2020-02-24 NOTE — TELEPHONE ENCOUNTER
Patient is asking if she can get a call back with questions regarding pre menopause. She says she has been on her period for two weeks and wants to know if this is normal and also has other questions and would like a call back.

## 2020-02-24 NOTE — TELEPHONE ENCOUNTER
Patient has been having long menses over the last year sometimes lasting 2 weeks.  She did have an endometrial biopsy but that was a year ago.  She will make an appointment in about 2 weeks for an ultrasound and a follow-up with me and we will decide at that time if we need to do another endometrial biopsy etc.  MACKENZIE

## 2020-02-26 DIAGNOSIS — N64.4 BREAST PAIN, RIGHT: ICD-10-CM

## 2020-03-04 ENCOUNTER — TELEPHONE (OUTPATIENT)
Dept: OBSTETRICS AND GYNECOLOGY | Facility: CLINIC | Age: 50
End: 2020-03-04

## 2020-03-04 NOTE — TELEPHONE ENCOUNTER
Patient doesn't feel that anything needs to be drained she will keep appointment tomorrow to be evaluated.

## 2020-03-04 NOTE — TELEPHONE ENCOUNTER
Patient called she is on the schedule for Monday so I left that appointment alone, but she called today for a new issue that arise.She has a open sore on her vaginal area around the vulva she said that is very sensitive and that it burns but doesn't itch. She said the area is very sore and swollen. The area is above the surface and she is most concerned that it is not healing or getting better it has been present for 3 days so far. I put her on tomorrow but she wants to know if she could be seen today or if she needs to be seen sooner where should she go or what can she do to help area in meantime.

## 2020-03-04 NOTE — TELEPHONE ENCOUNTER
I cannot  possibly add her in this afternoon.  I would just have her soak it in the tub and come in tomorrow and I will check it.  If she thinks that an abscess or is growing then she would need to go to the emergency room, but only if it something she thinks she needs drained. .  MACKENZIE

## 2020-03-05 ENCOUNTER — PROCEDURE VISIT (OUTPATIENT)
Dept: OBSTETRICS AND GYNECOLOGY | Facility: CLINIC | Age: 50
End: 2020-03-05

## 2020-03-05 ENCOUNTER — OFFICE VISIT (OUTPATIENT)
Dept: OBSTETRICS AND GYNECOLOGY | Facility: CLINIC | Age: 50
End: 2020-03-05

## 2020-03-05 VITALS
DIASTOLIC BLOOD PRESSURE: 66 MMHG | WEIGHT: 168 LBS | SYSTOLIC BLOOD PRESSURE: 102 MMHG | BODY MASS INDEX: 28.68 KG/M2 | HEIGHT: 64 IN

## 2020-03-05 DIAGNOSIS — N93.9 ABNORMAL UTERINE BLEEDING (AUB): Primary | ICD-10-CM

## 2020-03-05 DIAGNOSIS — N90.89 VULVAR LESION: ICD-10-CM

## 2020-03-05 DIAGNOSIS — N92.6 IRREGULAR MENSES: Primary | ICD-10-CM

## 2020-03-05 PROCEDURE — 58100 BIOPSY OF UTERUS LINING: CPT | Performed by: OBSTETRICS & GYNECOLOGY

## 2020-03-05 PROCEDURE — 76830 TRANSVAGINAL US NON-OB: CPT | Performed by: OBSTETRICS & GYNECOLOGY

## 2020-03-05 PROCEDURE — 99214 OFFICE O/P EST MOD 30 MIN: CPT | Performed by: OBSTETRICS & GYNECOLOGY

## 2020-03-05 RX ORDER — ACYCLOVIR 800 MG/1
TABLET ORAL
COMMUNITY
Start: 2020-03-04 | End: 2020-12-17 | Stop reason: SDUPTHER

## 2020-03-05 NOTE — PROGRESS NOTES
Subjective    Chief Complaint   Patient presents with   • Follow-up     AUB, bump on vag       History of Present Illness    Inez Olivares is a 50 y.o. female who presents for evaluation for a 2-week period which has recently stopped.  Patient has never been postmenopausal and is perimenopausal.  She did have a negative endometrial biopsy a year ago and a Pap smear in August which was negative.  She also had a lesion of her left labia and was seen in the emergency room at [facility] yesterday and was cultured but not cultured for herpes that I can see.  She was placed on acyclovir 800 mg twice daily for 5 days.  She states that the lesion is extremely painful.  Pelvic ultrasound today showed 0.2 cm of endometrial fluid but normal ovaries and normal uterus.    Obstetric History:  OB History        3    Para   3    Term   3            AB        Living           SAB        TAB        Ectopic        Molar        Multiple        Live Births                   Menstrual History:     No LMP recorded.       Past Medical History:   Diagnosis Date   • Anemia    • Asthma     ALLERGY RELATED   • Celiac disease    • PONV (postoperative nausea and vomiting)      Family History   Problem Relation Age of Onset   • Uterine cancer Mother    • Heart attack Mother    • Hypertension Mother    • Depression Mother    • Arthritis Mother    • Cataracts Mother    • Colon polyps Mother    • Alcohol abuse Father    • COPD Father         smoker   • Depression Sister    • Lung disease Maternal Grandmother    • Stroke Paternal Grandmother    • Malig Hyperthermia Neg Hx        The following portions of the patient's history were reviewed and updated as appropriate: allergies, current medications, past family history, past medical history, past social history, past surgical history and problem list.    Review of Systems  Positive for prolonged menses and lesion of left labia which is extremely painful.       Objective   Physical  "Exam  5 mm ulcer of left labia consistent with possible herpes.  Herpes culture performed.  Cervix without lesion.  Anterior lip of the cervix was grasped with a tenaculum and endometrial biopsy performed once easily without problem or dilatation.  Patient tolerated the procedure well.  /66   Ht 162.6 cm (64\")   Wt 76.2 kg (168 lb)   BMI 28.84 kg/m²     Assessment/Plan   Inez was seen today for follow-up.    Diagnoses and all orders for this visit:    Irregular menses    Vulvar lesion        Impression and plan.   25-minute visit today of which 20 minutes was face-to-face counseling both with the patient and on the phone with her  concerning etiology and evaluation of irregular menses and vulvar lesions.  I discussed the reason for herpes culture which was performed.  Also discussed the necessity of an endometrial biopsy which was performed and patient will call in 1 week for results of both.  She will continue her acyclovir for 5 days.               "

## 2020-03-07 LAB — HSV SPEC CULT: POSITIVE

## 2020-03-10 LAB
DX ICD CODE: NORMAL
PATH REPORT.FINAL DX SPEC: NORMAL
PATH REPORT.GROSS SPEC: NORMAL
PATH REPORT.SITE OF ORIGIN SPEC: NORMAL
PATHOLOGIST NAME: NORMAL
PAYMENT PROCEDURE: NORMAL

## 2020-03-12 ENCOUNTER — TELEPHONE (OUTPATIENT)
Dept: OBSTETRICS AND GYNECOLOGY | Facility: CLINIC | Age: 50
End: 2020-03-12

## 2020-03-12 DIAGNOSIS — N64.4 BREAST PAIN, RIGHT: ICD-10-CM

## 2020-03-30 ENCOUNTER — TELEPHONE (OUTPATIENT)
Dept: OBSTETRICS AND GYNECOLOGY | Facility: CLINIC | Age: 50
End: 2020-03-30

## 2020-03-30 RX ORDER — ACYCLOVIR 800 MG/1
800 TABLET ORAL 2 TIMES DAILY
Qty: 10 TABLET | Refills: 0 | Status: SHIPPED | OUTPATIENT
Start: 2020-03-30 | End: 2020-04-04

## 2020-11-09 ENCOUNTER — OFFICE VISIT (OUTPATIENT)
Dept: FAMILY MEDICINE CLINIC | Facility: CLINIC | Age: 50
End: 2020-11-09

## 2020-11-09 VITALS
RESPIRATION RATE: 16 BRPM | SYSTOLIC BLOOD PRESSURE: 102 MMHG | TEMPERATURE: 97.1 F | HEART RATE: 67 BPM | DIASTOLIC BLOOD PRESSURE: 60 MMHG | BODY MASS INDEX: 28.75 KG/M2 | WEIGHT: 168.4 LBS | HEIGHT: 64 IN | OXYGEN SATURATION: 99 %

## 2020-11-09 DIAGNOSIS — M72.2 PLANTAR FASCIITIS OF LEFT FOOT: Primary | ICD-10-CM

## 2020-11-09 PROCEDURE — 99213 OFFICE O/P EST LOW 20 MIN: CPT | Performed by: NURSE PRACTITIONER

## 2020-11-09 NOTE — PROGRESS NOTES
Subjective   Inez Olivares is a 50 y.o. female.     History of Present Illness   Answers for HPI/ROS submitted by the patient on 11/6/2020   What is the primary reason for your visit?: Other  Please describe your symptoms.: Having foot pain and need a referral for my foot doctor  Have you had these symptoms before?: No  How long have you been having these symptoms?: Greater than 2 weeks  Please list any medications you are currently taking for this condition.: Ibruprophen      Patient complains of left heel area pain. The symptoms began 3 months ago.  Pain is a result of no known event. Pain is located heel  region. Discomfort is described as aching and soreness. Symptoms are exacerbated by getting up to stand and pressure applied to area.  Evaluation to date: none. Therapy to date includes: OTC NSAIDs        The following portions of the patient's history were reviewed and updated as appropriate: allergies, current medications, past family history, past medical history, past social history, past surgical history and problem list.    Review of Systems   Constitutional: Negative for unexpected weight change.   Respiratory: Negative for shortness of breath.    Cardiovascular: Negative for chest pain and palpitations.   Musculoskeletal: Negative for arthralgias and gait problem.   Neurological: Negative for weakness.   Psychiatric/Behavioral: Negative for behavioral problems.       Objective   Physical Exam  Vitals signs and nursing note reviewed.   Constitutional:       Appearance: She is well-developed.   Cardiovascular:      Rate and Rhythm: Normal rate.   Pulmonary:      Effort: Pulmonary effort is normal.   Musculoskeletal:      Left foot: Normal range of motion and normal capillary refill. Tenderness present. No bony tenderness, swelling, crepitus, deformity or laceration.        Feet:    Neurological:      Mental Status: She is alert and oriented to person, place, and time.   Psychiatric:         Mood and  Affect: Mood normal.         Behavior: Behavior normal.         Thought Content: Thought content normal.         Judgment: Judgment normal.         Assessment/Plan   Problems Addressed this Visit     None      Visit Diagnoses     Plantar fasciitis of left foot    -  Primary    Relevant Orders    Ambulatory Referral to Podiatry      Diagnoses       Codes Comments    Plantar fasciitis of left foot    -  Primary ICD-10-CM: M72.2  ICD-9-CM: 728.71

## 2020-12-07 ENCOUNTER — TELEPHONE (OUTPATIENT)
Dept: OBSTETRICS AND GYNECOLOGY | Facility: CLINIC | Age: 50
End: 2020-12-07

## 2020-12-08 ENCOUNTER — APPOINTMENT (OUTPATIENT)
Dept: WOMENS IMAGING | Facility: HOSPITAL | Age: 50
End: 2020-12-08

## 2020-12-08 ENCOUNTER — OFFICE VISIT (OUTPATIENT)
Dept: OBSTETRICS AND GYNECOLOGY | Facility: CLINIC | Age: 50
End: 2020-12-08

## 2020-12-08 ENCOUNTER — PROCEDURE VISIT (OUTPATIENT)
Dept: OBSTETRICS AND GYNECOLOGY | Facility: CLINIC | Age: 50
End: 2020-12-08

## 2020-12-08 VITALS
HEIGHT: 64 IN | SYSTOLIC BLOOD PRESSURE: 110 MMHG | WEIGHT: 168 LBS | BODY MASS INDEX: 28.68 KG/M2 | DIASTOLIC BLOOD PRESSURE: 70 MMHG

## 2020-12-08 DIAGNOSIS — Z01.411 ENCOUNTER FOR GYNECOLOGICAL EXAMINATION WITH ABNORMAL FINDING: Primary | ICD-10-CM

## 2020-12-08 DIAGNOSIS — Z12.39 ENCOUNTER FOR BREAST CANCER SCREENING USING NON-MAMMOGRAM MODALITY: ICD-10-CM

## 2020-12-08 DIAGNOSIS — Z13.820 SCREENING FOR OSTEOPOROSIS: ICD-10-CM

## 2020-12-08 DIAGNOSIS — N89.8 VAGINAL DISCHARGE: ICD-10-CM

## 2020-12-08 DIAGNOSIS — Z12.31 VISIT FOR SCREENING MAMMOGRAM: Primary | ICD-10-CM

## 2020-12-08 PROCEDURE — 99396 PREV VISIT EST AGE 40-64: CPT | Performed by: OBSTETRICS & GYNECOLOGY

## 2020-12-08 PROCEDURE — 77067 SCR MAMMO BI INCL CAD: CPT | Performed by: RADIOLOGY

## 2020-12-08 PROCEDURE — 77067 SCR MAMMO BI INCL CAD: CPT | Performed by: OBSTETRICS & GYNECOLOGY

## 2020-12-08 PROCEDURE — 77063 BREAST TOMOSYNTHESIS BI: CPT | Performed by: RADIOLOGY

## 2020-12-08 PROCEDURE — 77063 BREAST TOMOSYNTHESIS BI: CPT | Performed by: OBSTETRICS & GYNECOLOGY

## 2020-12-08 NOTE — PROGRESS NOTES
Subjective    Chief Complaint   Patient presents with   • Gynecologic Exam     AE, Pt states having discharge, itching       History of Present Illness    Inez Olivares is a 50 y.o. female who presents for annual exam.  Non-smoker.  Mammogram today.  Benign endometrial biopsy 2020.  Scheduling DEXA scan.  Colonoscopy 1-1/2 years ago and not due.  Patient has history of celiac disease.  She has had an increased vaginal discharge over the last 3 days but no itching noted.    Obstetric History:  OB History        3    Para   3    Term   3            AB        Living           SAB        TAB        Ectopic        Molar        Multiple        Live Births                   Menstrual History:     No LMP recorded.       Past Medical History:   Diagnosis Date   • Anemia    • Asthma     ALLERGY RELATED   • Celiac disease    • PONV (postoperative nausea and vomiting)      Family History   Problem Relation Age of Onset   • Uterine cancer Mother    • Heart attack Mother    • Hypertension Mother    • Depression Mother    • Arthritis Mother    • Cataracts Mother    • Colon polyps Mother    • Alcohol abuse Father    • COPD Father         smoker   • Depression Sister    • Lung disease Maternal Grandmother    • Stroke Paternal Grandmother    • Malig Hyperthermia Neg Hx      Social History     Tobacco Use   Smoking Status Never Smoker   Smokeless Tobacco Never Used         The following portions of the patient's history were reviewed and updated as appropriate: allergies, current medications, past family history, past medical history, past social history, past surgical history and problem list.    Review of Systems   Constitutional: Negative.  Negative for fever and unexpected weight change.   HENT: Negative.    Respiratory: Negative for shortness of breath and wheezing.    Cardiovascular: Negative for chest pain, palpitations and leg swelling.   Gastrointestinal: Negative for abdominal pain, anal bleeding and  blood in stool.   Genitourinary: Positive for vaginal discharge. Negative for dysuria, pelvic pain, urgency, vaginal bleeding and vaginal pain.   Skin: Negative.    Neurological: Negative.    Hematological: Negative.  Negative for adenopathy.   Psychiatric/Behavioral: Negative.  Negative for dysphoric mood. The patient is not nervous/anxious.             Objective   Physical Exam  Exam conducted with a chaperone present.   Constitutional:       Appearance: Normal appearance. She is well-developed.   HENT:      Head: Normocephalic.   Neck:      Thyroid: No thyromegaly.      Trachea: Trachea normal. No tracheal deviation.   Cardiovascular:      Rate and Rhythm: Normal rate and regular rhythm.      Heart sounds: Normal heart sounds. No murmur.   Pulmonary:      Effort: Pulmonary effort is normal.      Breath sounds: Normal breath sounds.   Chest:      Breasts:         Right: Normal. No mass, nipple discharge or tenderness.         Left: Normal. No mass, nipple discharge or tenderness.   Abdominal:      Palpations: Abdomen is soft. There is no mass.      Tenderness: There is no abdominal tenderness.      Hernia: No hernia is present.   Genitourinary:     General: Normal vulva.      Labia:         Right: No tenderness or lesion.         Left: No tenderness or lesion.       Urethra: No prolapse or urethral lesion.      Vagina: Vaginal discharge present. No lesions.      Cervix: No cervical motion tenderness.      Uterus: Not enlarged and not tender.       Adnexa:         Right: No mass or tenderness.          Left: No mass or tenderness.        Rectum: Normal. No external hemorrhoid or internal hemorrhoid. Normal anal tone.      Comments: External genitalia normal .  Positive yellow discharge.  Culture performed.  Lymphadenopathy:      Cervical: No cervical adenopathy.      Upper Body:      Right upper body: No axillary adenopathy.      Left upper body: No axillary adenopathy.   Skin:     General: Skin is warm and dry.     "  Findings: No rash.   Neurological:      Mental Status: She is alert and oriented to person, place, and time.   Psychiatric:         Behavior: Behavior normal.         /70   Ht 162.6 cm (64\")   Wt 76.2 kg (168 lb)   BMI 28.84 kg/m²     Assessment/Plan   Diagnoses and all orders for this visit:    1. Encounter for gynecological examination with abnormal finding (Primary)  -     IGP,rfx Aptima HPV All Pth    2. Vaginal discharge  -     NuSwab VG+ - Swab, Vagina    3. Screening for osteoporosis  -     DEXA Bone Density Axial; Future    4. Encounter for breast cancer screening using non-mammogram modality        Mammogram.  Patient to call 1 week for results.  Patient previously has had BV and this may be what we are dealing with today.  Counseled about beginning calcium with vitamin D3.             "

## 2020-12-12 LAB
A VAGINAE DNA VAG QL NAA+PROBE: ABNORMAL SCORE
BVAB2 DNA VAG QL NAA+PROBE: ABNORMAL SCORE
C ALBICANS DNA VAG QL NAA+PROBE: NEGATIVE
C GLABRATA DNA VAG QL NAA+PROBE: NEGATIVE
C TRACH DNA VAG QL NAA+PROBE: NEGATIVE
MEGA1 DNA VAG QL NAA+PROBE: ABNORMAL SCORE
N GONORRHOEA DNA VAG QL NAA+PROBE: NEGATIVE
T VAGINALIS DNA VAG QL NAA+PROBE: NEGATIVE

## 2020-12-14 ENCOUNTER — TELEPHONE (OUTPATIENT)
Dept: OBSTETRICS AND GYNECOLOGY | Facility: CLINIC | Age: 50
End: 2020-12-14

## 2020-12-14 RX ORDER — METRONIDAZOLE 500 MG/1
500 TABLET ORAL 3 TIMES DAILY
Qty: 15 TABLET | Refills: 0 | Status: SHIPPED | OUTPATIENT
Start: 2020-12-14 | End: 2020-12-19

## 2020-12-14 NOTE — TELEPHONE ENCOUNTER
----- Message from En Caballero MD sent at 12/14/2020  9:20 AM EST -----  Please tell pt vag culture showed BV and nothing else. I'll send flagyl to her pharmacy. Please find out which one she uses and explain no alcohol with it. Thanks Olivier

## 2020-12-14 NOTE — TELEPHONE ENCOUNTER
Pt aware of results and to not drink alcohol when taking medication, stated understanding. Would like sent to [facility] on [address] in chart please. Knows will be sent today

## 2020-12-17 RX ORDER — ACYCLOVIR 800 MG/1
800 TABLET ORAL 2 TIMES DAILY
Qty: 10 TABLET | Refills: 0 | Status: SHIPPED | OUTPATIENT
Start: 2020-12-17 | End: 2020-12-22

## 2021-01-05 ENCOUNTER — OFFICE VISIT (OUTPATIENT)
Dept: FAMILY MEDICINE CLINIC | Facility: CLINIC | Age: 51
End: 2021-01-05

## 2021-01-05 VITALS
BODY MASS INDEX: 28.68 KG/M2 | TEMPERATURE: 97.6 F | HEIGHT: 64 IN | RESPIRATION RATE: 16 BRPM | DIASTOLIC BLOOD PRESSURE: 60 MMHG | WEIGHT: 168 LBS | SYSTOLIC BLOOD PRESSURE: 110 MMHG | HEART RATE: 62 BPM

## 2021-01-05 DIAGNOSIS — R06.83 SNORING: Primary | ICD-10-CM

## 2021-01-05 DIAGNOSIS — R40.0 HAS DAYTIME DROWSINESS: ICD-10-CM

## 2021-01-05 PROCEDURE — 99214 OFFICE O/P EST MOD 30 MIN: CPT | Performed by: NURSE PRACTITIONER

## 2021-01-05 NOTE — PROGRESS NOTES
"Subjective   Inez Olivares is a 50 y.o. female.     History of Present Illness    Since the last visit, she has overall felt tired.  She reports she has been told she snores for some time now, but that her  has told her it is getting worse. She does report daytime drowsiness and palpitations at times.   /60   Pulse 62   Temp 97.6 °F (36.4 °C)   Resp 16   Ht 162.6 cm (64\")   Wt 76.2 kg (168 lb)   BMI 28.84 kg/m²     Results for orders placed or performed in visit on 12/08/20   NuSwab VG+ - Swab, Vagina    Specimen: Vagina; Swab    SWAB   Result Value Ref Range    Atopobium Vaginae Moderate - 1 Score    BVAB 2 High - 2 (A) Score    Megasphaera 1 High - 2 (A) Score    Toyin Albicans, MARQUES Negative Negative    Candida Glabrata, MARQUES Negative Negative    Trichomonas vaginosis Negative Negative    Chlamydia trachomatis, MARQUES Negative Negative    Neisseria gonorrhoeae, MARQUES Negative Negative   IGP,rfx Aptima HPV All Pth    Specimen: Cervix; ThinPrep Vial    THINPREP   Result Value Ref Range    Diagnosis Comment     Specimen adequacy: Comment     Clinician Provided ICD-10: Comment     Performed by: Comment     . .     Note: Comment     Method: Comment     Conv .conv Comment          The following portions of the patient's history were reviewed and updated as appropriate: allergies, current medications, past family history, past medical history, past social history, past surgical history and problem list.    Review of Systems   Constitutional: Negative for unexpected weight change.   Respiratory: Negative for shortness of breath.    Cardiovascular: Negative for chest pain and palpitations.   Endocrine: Negative for cold intolerance, heat intolerance, polydipsia, polyphagia and polyuria.   Psychiatric/Behavioral: Negative for behavioral problems.       Objective   Physical Exam  Vitals signs and nursing note reviewed.   Constitutional:       Appearance: Normal appearance. She is well-developed. "   Cardiovascular:      Rate and Rhythm: Normal rate and regular rhythm.   Pulmonary:      Effort: Pulmonary effort is normal.      Breath sounds: Normal breath sounds.   Neurological:      Mental Status: She is alert and oriented to person, place, and time.   Psychiatric:         Mood and Affect: Mood normal.         Behavior: Behavior normal.         Thought Content: Thought content normal.         Judgment: Judgment normal.         Assessment/Plan   Diagnoses and all orders for this visit:    1. Snoring (Primary)  -     Ambulatory Referral to Sleep Medicine  -     Comprehensive metabolic panel  -     Lipid panel  -     CBC and Differential  -     TSH    2. Has daytime drowsiness  -     Ambulatory Referral to Sleep Medicine  -     Comprehensive metabolic panel  -     Lipid panel  -     CBC and Differential  -     TSH

## 2021-01-06 LAB
ALBUMIN SERPL-MCNC: 4.4 G/DL (ref 3.5–5.2)
ALBUMIN/GLOB SERPL: 2 G/DL
ALP SERPL-CCNC: 63 U/L (ref 39–117)
ALT SERPL-CCNC: 11 U/L (ref 1–33)
AST SERPL-CCNC: 15 U/L (ref 1–32)
BASOPHILS # BLD AUTO: 0.04 10*3/MM3 (ref 0–0.2)
BASOPHILS NFR BLD AUTO: 0.7 % (ref 0–1.5)
BILIRUB SERPL-MCNC: 0.3 MG/DL (ref 0–1.2)
BUN SERPL-MCNC: 12 MG/DL (ref 6–20)
BUN/CREAT SERPL: 17.6 (ref 7–25)
CALCIUM SERPL-MCNC: 9.4 MG/DL (ref 8.6–10.5)
CHLORIDE SERPL-SCNC: 104 MMOL/L (ref 98–107)
CHOLEST SERPL-MCNC: 192 MG/DL (ref 0–200)
CO2 SERPL-SCNC: 27.2 MMOL/L (ref 22–29)
CREAT SERPL-MCNC: 0.68 MG/DL (ref 0.57–1)
EOSINOPHIL # BLD AUTO: 0.1 10*3/MM3 (ref 0–0.4)
EOSINOPHIL NFR BLD AUTO: 1.8 % (ref 0.3–6.2)
ERYTHROCYTE [DISTWIDTH] IN BLOOD BY AUTOMATED COUNT: 14.2 % (ref 12.3–15.4)
GLOBULIN SER CALC-MCNC: 2.2 GM/DL
GLUCOSE SERPL-MCNC: 82 MG/DL (ref 65–99)
HCT VFR BLD AUTO: 29.1 % (ref 34–46.6)
HDLC SERPL-MCNC: 62 MG/DL (ref 40–60)
HGB BLD-MCNC: 9.4 G/DL (ref 12–15.9)
IMM GRANULOCYTES # BLD AUTO: 0.01 10*3/MM3 (ref 0–0.05)
IMM GRANULOCYTES NFR BLD AUTO: 0.2 % (ref 0–0.5)
LDLC SERPL CALC-MCNC: 117 MG/DL (ref 0–100)
LYMPHOCYTES # BLD AUTO: 1.13 10*3/MM3 (ref 0.7–3.1)
LYMPHOCYTES NFR BLD AUTO: 20.8 % (ref 19.6–45.3)
MCH RBC QN AUTO: 26 PG (ref 26.6–33)
MCHC RBC AUTO-ENTMCNC: 32.3 G/DL (ref 31.5–35.7)
MCV RBC AUTO: 80.4 FL (ref 79–97)
MONOCYTES # BLD AUTO: 0.52 10*3/MM3 (ref 0.1–0.9)
MONOCYTES NFR BLD AUTO: 9.6 % (ref 5–12)
NEUTROPHILS # BLD AUTO: 3.62 10*3/MM3 (ref 1.7–7)
NEUTROPHILS NFR BLD AUTO: 66.9 % (ref 42.7–76)
NRBC BLD AUTO-RTO: 0 /100 WBC (ref 0–0.2)
PLATELET # BLD AUTO: 317 10*3/MM3 (ref 140–450)
POTASSIUM SERPL-SCNC: 4.2 MMOL/L (ref 3.5–5.2)
PROT SERPL-MCNC: 6.6 G/DL (ref 6–8.5)
RBC # BLD AUTO: 3.62 10*6/MM3 (ref 3.77–5.28)
SODIUM SERPL-SCNC: 140 MMOL/L (ref 136–145)
TRIGL SERPL-MCNC: 69 MG/DL (ref 0–150)
TSH SERPL DL<=0.005 MIU/L-ACNC: 2.16 UIU/ML (ref 0.27–4.2)
VLDLC SERPL CALC-MCNC: 13 MG/DL (ref 5–40)
WBC # BLD AUTO: 5.42 10*3/MM3 (ref 3.4–10.8)

## 2021-01-18 RX ORDER — ACYCLOVIR 800 MG/1
800 TABLET ORAL 2 TIMES DAILY
Qty: 10 TABLET | Refills: 0 | Status: SHIPPED | OUTPATIENT
Start: 2021-01-18 | End: 2021-01-23

## 2021-01-26 ENCOUNTER — TELEPHONE (OUTPATIENT)
Dept: FAMILY MEDICINE CLINIC | Facility: CLINIC | Age: 51
End: 2021-01-26

## 2021-01-26 ENCOUNTER — OFFICE VISIT (OUTPATIENT)
Dept: SLEEP MEDICINE | Facility: HOSPITAL | Age: 51
End: 2021-01-26

## 2021-01-26 VITALS
DIASTOLIC BLOOD PRESSURE: 76 MMHG | HEART RATE: 75 BPM | OXYGEN SATURATION: 100 % | WEIGHT: 165 LBS | BODY MASS INDEX: 28.17 KG/M2 | HEIGHT: 64 IN | SYSTOLIC BLOOD PRESSURE: 111 MMHG

## 2021-01-26 DIAGNOSIS — G47.10 HYPERSOMNIA: ICD-10-CM

## 2021-01-26 DIAGNOSIS — G47.33 OSA (OBSTRUCTIVE SLEEP APNEA): Primary | ICD-10-CM

## 2021-01-26 PROCEDURE — G0463 HOSPITAL OUTPT CLINIC VISIT: HCPCS

## 2021-01-26 NOTE — TELEPHONE ENCOUNTER
S/w patient, she felt like she was rushed through her appointment and not listened to.  She felt like she was not given any options.  I advised the patient to go ahead with the sleep study as it would give them a lot of information to better help her with.  She stated that she may still want a second opinion after her sleep study.

## 2021-01-26 NOTE — TELEPHONE ENCOUNTER
PATIENT HAD CONSULT FOR SLEEP STUDY THIS MORNING. PATIENT HAS CONCERNS ABOUT THE CARE SHE RECEIVED. PATIENT WOULD LIKE TO SPEAK WITH KEKE ALVA ABOUT POSSIBLY CONSULTING WITH ANOTHER PHYSICIAN OR OTHER OPTIONS PATIENT CAN LOOK INTO.    PLEASE ADVISE: [phone]

## 2021-01-26 NOTE — TELEPHONE ENCOUNTER
l   Hub staff attempted to follow warm transfer process and was unsuccessful     Caller: Inez Olivares    Relationship to patient: Self    Best call back number: [phone]   Patient is needing:     RETURNED CALL BACK TO RANDA

## 2021-01-26 NOTE — PROGRESS NOTES
Sleep Medicine initial Consultation    Inez Olivares  : 1970  51 y.o. female   Date of Service: 2021  Referring provider: Jennifer Mancera,*    History Of Present Illness:   Mrs. Inez Olivares  is a 51 y.o. right handed Caucasianfemale is seen in the sleep clinic for Snoring, Excessive Daytime Sleepiness, Sleep Apnea and Chronic Fatigue.     The patient c/o excessive daytime sleepiness,  and chronic fatigue.  There is no history of hypnagogic hallucinations, sleep paralysis or cataplexy.    The patient complains of snoring loud in all sleeping positions, awakened gasping for breath, excessive sweating during sleep and has gained 5 lbs of weight the the last 5 years.    The patient complains of bruxism during sleep.     The patient complains of difficulty staying asleep, frequent awakenings pain, has restless sleep, Does not feel rested even after a long sleep and Still feels sleepy even when increasing sleep time.    There is no h/O sleepwalking or bedwetting or nightmares or sleep eating or acting out dreams.    Works: [employer].    Sleep schedule: While Working: Bed time: 10:30 pm and wake up time: 7 am: sleep Latency : few minutes and Total Sleep Time: 6 hours, 30 minutes.     Naps: Yes.  Takes 30 minutes nap.    Caffeine intake: 3 Cups of coffee.  Occasionally Diet Coke.  [place] Sleepiness Scale: 17/24.    Past Medical History:   Diagnosis Date   • Anemia    • Asthma     ALLERGY RELATED   • Celiac disease    • PONV (postoperative nausea and vomiting)      Past Surgical History:   Procedure Laterality Date   •  SECTION      X2   • COLONOSCOPY N/A 2019    Procedure: COLONOSCOPY;  Surgeon: Sindhu Avilez MD;  Location: [facility] ENDOSCOPY;  Service: Gastroenterology   • CORRECTION HAMMER TOE  2018   • CORRECTION HAMMER TOE     • ENDOSCOPY N/A 2019    Procedure: ESOPHAGOGASTRODUODENOSCOPY;  Surgeon: Sindhu Avilez MD;  Location: [facility] ENDOSCOPY;  Service:  Gastroenterology   • TUBAL ABDOMINAL LIGATION  03/31/2016   • TUBAL COAGULATION LAPAROSCOPIC N/A 3/31/2016    Procedure: TUBAL COAGULATION LAPAROSCOPIC;  Surgeon: En Caballero MD;  Location: [facility];  Service:    • UPPER GASTROINTESTINAL ENDOSCOPY  1/21/2019     Current Outpatient Medications on File Prior to Visit   Medication Sig Dispense Refill   • Ascorbic Acid (VITAMIN C PO) Take  by mouth.     • BIOTIN PO Take  by mouth.     • ibuprofen (ADVIL,MOTRIN) 200 MG tablet Take 200 mg by mouth Every 6 (Six) Hours As Needed for Mild Pain .     • IRON-VITAMINS PO Take 1 tablet by mouth Daily.     • L-LYSINE PO Take  by mouth.     • Multiple Vitamins-Minerals (MULTIVITAMIN ADULT PO) Take 1 tablet by mouth Daily.       No current facility-administered medications on file prior to visit.      No Known Allergies  Family History   Problem Relation Age of Onset   • Uterine cancer Mother    • Heart attack Mother    • Hypertension Mother    • Depression Mother    • Arthritis Mother    • Cataracts Mother    • Colon polyps Mother    • Alcohol abuse Father    • COPD Father         smoker   • Depression Sister    • Lung disease Maternal Grandmother    • Stroke Paternal Grandmother    • Malig Hyperthermia Neg Hx      Social History     Socioeconomic History   • Marital status:      Spouse name: Not on file   • Number of children: Not on file   • Years of education: Not on file   • Highest education level: Not on file   Tobacco Use   • Smoking status: Never Smoker   • Smokeless tobacco: Never Used   Substance and Sexual Activity   • Alcohol use: Yes     Types: 1 Glasses of wine per week     Comment: occ   • Drug use: No   • Sexual activity: Yes     Partners: Male     Birth control/protection: None     Comment: lmp 3/5/16     Review of Systems   Musculoskeletal: Positive for arthralgias and joint swelling (Right hip pain).     Patient examination:  Vitals:    01/26/21 0835   BP: 111/76   Pulse: 75   SpO2: 100%   Weight:  "74.8 kg (165 lb)   Height: 162.6 cm (64\")    Body mass index is 28.32 kg/m².  Neck Circumference: 13 inches   HEENT: Normal   Neck: Supple no carotid bruits.  Lungs: Clear to auscultation.  Cardiac exam: Normal and regular rate and rhythm. No murmurs.  Extremities: No edema clubbing or cyanosis.    ASSESSMENT AND PLAN:The patient has symptoms of obstructive sleep apnea syndrome with hypersomnia. We will proceed with polysomnography and treat with CPAP therapy if needed. Sleep hygiene techniques discussed.  Exercise diet and weight loss discussed.    Encounter Diagnoses   Name Primary?   • KIMBERLYN (obstructive sleep apnea) Yes   • Hypersomnia      Orders Placed This Encounter   Procedures   • Home Sleep Study     Return in about 3 months (around 4/26/2021).    Nargis Marquez MD  1/26/2021  09:33 EST  "

## 2021-02-18 ENCOUNTER — HOSPITAL ENCOUNTER (OUTPATIENT)
Dept: SLEEP MEDICINE | Facility: HOSPITAL | Age: 51
End: 2021-02-18

## 2021-02-24 ENCOUNTER — HOSPITAL ENCOUNTER (OUTPATIENT)
Dept: SLEEP MEDICINE | Facility: HOSPITAL | Age: 51
Discharge: HOME | End: 2021-02-24
Admitting: PSYCHIATRY & NEUROLOGY

## 2021-02-24 DIAGNOSIS — G47.10 HYPERSOMNIA: ICD-10-CM

## 2021-02-24 DIAGNOSIS — G47.33 OSA (OBSTRUCTIVE SLEEP APNEA): ICD-10-CM

## 2021-02-24 PROCEDURE — 95806 SLEEP STUDY UNATT&RESP EFFT: CPT

## 2021-03-03 ENCOUNTER — TELEPHONE (OUTPATIENT)
Dept: SLEEP MEDICINE | Facility: HOSPITAL | Age: 51
End: 2021-03-03

## 2021-03-11 DIAGNOSIS — Z78.0 POST-MENOPAUSAL: Primary | ICD-10-CM

## 2021-03-15 ENCOUNTER — APPOINTMENT (OUTPATIENT)
Dept: WOMENS IMAGING | Facility: HOSPITAL | Age: 51
End: 2021-03-15

## 2021-03-15 PROCEDURE — 77080 DXA BONE DENSITY AXIAL: CPT | Performed by: RADIOLOGY

## 2021-03-23 ENCOUNTER — TELEPHONE (OUTPATIENT)
Dept: OBSTETRICS AND GYNECOLOGY | Facility: CLINIC | Age: 51
End: 2021-03-23

## 2021-03-23 DIAGNOSIS — Z78.0 POST-MENOPAUSAL: ICD-10-CM

## 2021-03-23 NOTE — TELEPHONE ENCOUNTER
----- Message from En Caballero MD sent at 3/23/2021  1:32 PM EDT -----  Please tell patient that her bone density is totally normal.  MACKENZIE

## 2021-03-26 ENCOUNTER — BULK ORDERING (OUTPATIENT)
Dept: CASE MANAGEMENT | Facility: OTHER | Age: 51
End: 2021-03-26

## 2021-03-26 DIAGNOSIS — Z23 IMMUNIZATION DUE: ICD-10-CM

## 2021-03-31 DIAGNOSIS — Z13.820 SCREENING FOR OSTEOPOROSIS: ICD-10-CM

## 2021-05-11 RX ORDER — VALACYCLOVIR HYDROCHLORIDE 1 G/1
1000 TABLET, FILM COATED ORAL DAILY
Qty: 5 TABLET | Refills: 0 | Status: SHIPPED | OUTPATIENT
Start: 2021-05-11 | End: 2021-05-16

## 2021-05-12 ENCOUNTER — OFFICE VISIT (OUTPATIENT)
Dept: GASTROENTEROLOGY | Facility: CLINIC | Age: 51
End: 2021-05-12

## 2021-05-12 VITALS
BODY MASS INDEX: 28.51 KG/M2 | SYSTOLIC BLOOD PRESSURE: 115 MMHG | DIASTOLIC BLOOD PRESSURE: 72 MMHG | HEIGHT: 64 IN | WEIGHT: 167 LBS

## 2021-05-12 DIAGNOSIS — N92.1 MENORRHAGIA WITH IRREGULAR CYCLE: ICD-10-CM

## 2021-05-12 DIAGNOSIS — D50.8 OTHER IRON DEFICIENCY ANEMIA: Chronic | ICD-10-CM

## 2021-05-12 DIAGNOSIS — K90.0 CELIAC DISEASE: Primary | Chronic | ICD-10-CM

## 2021-05-12 PROCEDURE — 99214 OFFICE O/P EST MOD 30 MIN: CPT | Performed by: INTERNAL MEDICINE

## 2021-05-12 NOTE — PROGRESS NOTES
Chief Complaint   Patient presents with   • Celiac Disease       Subjective     HPI    Inez Juarez is a 51 y.o. female with a past medical history noted below who presents for follow up.  She was seen previously for iron def anemia and was found to have celiac disease on endoscopy and confirmed by serologies.  She was last seen in 2019.  On her follow-up after being on a gluten-free diet, she had shown improvement in her tissue transglutaminase IgA levels.  She has not been seen since that time.    She feels she has been doing well with her gluten free diet.  No rashes.  Labs from January show anemia but she is santhosh-menopausal and having some menorrhagia.  She is followed by her gynecologist.  No plans for ablation at this time.  She is on a multivitamin with a small amount of iron in it.  However, she does have some iron tablets at home.    Having some issues with constipation.  Does better when she aggressively monitors her diet and gets good fluid intake.    Today's visit was in the office.  Both the patient and I were wearing face masks and proper hand hygiene was performed before and after the physical exam.           Current Outpatient Medications:   •  Ascorbic Acid (VITAMIN C PO), Take  by mouth., Disp: , Rfl:   •  BIOTIN PO, Take  by mouth., Disp: , Rfl:   •  cetirizine (zyrTEC) 10 MG tablet, Take 1 tablet by mouth Daily., Disp: 30 tablet, Rfl: 0  •  hydrocortisone 0.5 % cream, Apply  topically to the appropriate area as directed 2 (Two) Times a Day., Disp: 28 g, Rfl: 0  •  hydrOXYzine (ATARAX) 25 MG tablet, Take 1 tablet by mouth 3 (Three) Times a Day As Needed for Itching., Disp: 30 tablet, Rfl: 0  •  ibuprofen (ADVIL,MOTRIN) 200 MG tablet, Take 200 mg by mouth Every 6 (Six) Hours As Needed for Mild Pain ., Disp: , Rfl:   •  IRON-VITAMINS PO, Take 1 tablet by mouth Daily., Disp: , Rfl:   •  L-LYSINE PO, Take  by mouth., Disp: , Rfl:   •  Multiple Vitamins-Minerals (MULTIVITAMIN ADULT PO), Take 1 tablet  by mouth Daily., Disp: , Rfl:   •  predniSONE (DELTASONE) 10 MG tablet, Take 1 tablet by mouth 2 (Two) Times a Day., Disp: 10 tablet, Rfl: 0  •  Probiotic Product (PROBIOTIC PO), Take  by mouth., Disp: , Rfl:   •  TURMERIC PO, Take  by mouth., Disp: , Rfl:   •  valACYclovir (Valtrex) 1000 MG tablet, Take 1 tablet by mouth Daily for 5 days., Disp: 5 tablet, Rfl: 0  •  VITAMIN D PO, Take  by mouth., Disp: , Rfl:       Objective     Vitals:    05/12/21 1526   BP: 115/72         05/12/21  1526   Weight: 75.8 kg (167 lb)     Body mass index is 28.67 kg/m².    Physical Exam  Vitals reviewed.   Constitutional:       General: She is not in acute distress.     Appearance: Normal appearance. She is well-developed. She is not diaphoretic.   HENT:      Head: Normocephalic.   Neck:      Thyroid: No thyromegaly.   Cardiovascular:      Rate and Rhythm: Normal rate and regular rhythm.   Pulmonary:      Effort: Pulmonary effort is normal. No respiratory distress.      Breath sounds: Normal breath sounds. No wheezing.   Abdominal:      General: Bowel sounds are normal. There is no distension.      Palpations: Abdomen is soft. Abdomen is not rigid. There is no mass.      Tenderness: There is no abdominal tenderness. There is no guarding or rebound.      Hernia: No hernia is present.   Genitourinary:     Comments: Rectal exam deferred  Musculoskeletal:         General: No tenderness.   Neurological:      Mental Status: She is alert and oriented to person, place, and time.      Motor: No atrophy.      Coordination: Coordination normal.   Psychiatric:         Behavior: Behavior normal.         Thought Content: Thought content normal.         Judgment: Judgment normal.             WBC   Date Value Ref Range Status   01/05/2021 5.42 3.40 - 10.80 10*3/mm3 Final     RBC   Date Value Ref Range Status   01/05/2021 3.62 (L) 3.77 - 5.28 10*6/mm3 Final     Hemoglobin   Date Value Ref Range Status   01/05/2021 9.4 (L) 12.0 - 15.9 g/dL Final    03/24/2016 11.4 (L) 11.9 - 15.5 g/dL Final     Hematocrit   Date Value Ref Range Status   01/05/2021 29.1 (L) 34.0 - 46.6 % Final   03/24/2016 35.3 (L) 35.6 - 45.5 % Final     MCV   Date Value Ref Range Status   01/05/2021 80.4 79.0 - 97.0 fL Final   03/24/2016 82.7 80.5 - 98.2 fL Final     MCH   Date Value Ref Range Status   01/05/2021 26.0 (L) 26.6 - 33.0 pg Final   03/24/2016 26.7 (L) 26.9 - 32.7 pg Final     MCHC   Date Value Ref Range Status   01/05/2021 32.3 31.5 - 35.7 g/dL Final   03/24/2016 32.3 (L) 32.4 - 36.3 g/dL Final     RDW   Date Value Ref Range Status   01/05/2021 14.2 12.3 - 15.4 % Final   03/24/2016 12.7 11.7 - 13.0 % Final     RDW-SD   Date Value Ref Range Status   03/24/2016 38.2 37.0 - 54.0 fl Final     MPV   Date Value Ref Range Status   03/24/2016 9.3 6.0 - 12.0 fL Final     Platelets   Date Value Ref Range Status   01/05/2021 317 140 - 450 10*3/mm3 Final   03/24/2016 229 140 - 500 10*3/mm3 Final     Neutrophil Rel %   Date Value Ref Range Status   01/05/2021 66.9 42.7 - 76.0 % Final     Neutrophil %   Date Value Ref Range Status   03/24/2016 63.9 42.7 - 76.0 % Final     Lymphocyte Rel %   Date Value Ref Range Status   01/05/2021 20.8 19.6 - 45.3 % Final     Lymphocyte %   Date Value Ref Range Status   03/24/2016 24.6 19.6 - 45.3 % Final     Monocyte Rel %   Date Value Ref Range Status   01/05/2021 9.6 5.0 - 12.0 % Final     Monocyte %   Date Value Ref Range Status   03/24/2016 9.3 5.0 - 12.0 % Final     Eosinophil Rel %   Date Value Ref Range Status   01/05/2021 1.8 0.3 - 6.2 % Final     Eosinophil %   Date Value Ref Range Status   03/24/2016 1.8 0.3 - 6.2 % Final     Basophil Rel %   Date Value Ref Range Status   01/05/2021 0.7 0.0 - 1.5 % Final     Basophil %   Date Value Ref Range Status   03/24/2016 0.4 0.0 - 1.5 % Final     Immature Grans %   Date Value Ref Range Status   03/24/2016 0.0 0.0 - 0.5 % Final     Neutrophils Absolute   Date Value Ref Range Status   01/05/2021 3.62 1.70 -  7.00 10*3/mm3 Final     Neutrophils, Absolute   Date Value Ref Range Status   03/24/2016 3.57 1.90 - 8.10 10*3/mm3 Final     Lymphocytes Absolute   Date Value Ref Range Status   01/05/2021 1.13 0.70 - 3.10 10*3/mm3 Final     Lymphocytes, Absolute   Date Value Ref Range Status   03/24/2016 1.37 0.90 - 4.80 10*3/mm3 Final     Monocytes Absolute   Date Value Ref Range Status   01/05/2021 0.52 0.10 - 0.90 10*3/mm3 Final     Monocytes, Absolute   Date Value Ref Range Status   03/24/2016 0.52 0.20 - 1.20 10*3/mm3 Final     Eosinophils Absolute   Date Value Ref Range Status   01/05/2021 0.10 0.00 - 0.40 10*3/mm3 Final     Eosinophils, Absolute   Date Value Ref Range Status   03/24/2016 0.10 0.00 - 0.70 10*3/mm3 Final     Basophils Absolute   Date Value Ref Range Status   01/05/2021 0.04 0.00 - 0.20 10*3/mm3 Final     Basophils, Absolute   Date Value Ref Range Status   03/24/2016 0.02 0.00 - 0.20 10*3/mm3 Final     Immature Grans, Absolute   Date Value Ref Range Status   03/24/2016 0.00 0.00 - 0.03 10*3/mm3 Final     nRBC   Date Value Ref Range Status   01/05/2021 0.0 0.0 - 0.2 /100 WBC Final       Lab Results   Component Value Date    BUN 12 01/05/2021    CREATININE 0.68 01/05/2021    EGFRIFNONA 92 01/05/2021    EGFRIFAFRI 111 01/05/2021    BCR 17.6 01/05/2021    CO2 27.2 01/05/2021    CALCIUM 9.4 01/05/2021    PROTENTOTREF 6.6 01/05/2021    ALBUMIN 4.40 01/05/2021    LABIL2 2.0 01/05/2021    AST 15 01/05/2021    ALT 11 01/05/2021         Imaging Results (Last 7 Days)     ** No results found for the last 168 hours. **          I personally reviewed data as detailed below:     The labs listed above.    Endoscopy procedures and pathology from: 1/2019 EGD and colonscopy      No notes on file    Assessment/Plan    1.  Celiac disease: Confirmed by endoscopy with biopsies and tissue transglutaminase IgA levels back in 2019.  She reports good adherence to gluten-free diet    2.  Iron deficiency anemia: With above.  However most  recently, she has been having some menorrhagia and likely is having some significant blood losses due to that    3.  Menorrhagia: She is perimenopausal, followed by her gynecologist    Plan  Update labs with CBC, CMP today  Follow-up tissue transglutaminase IgA  Iron studies  Recommended iron supplementation given her anemia  Further recommendations after her labs are back    Diagnoses and all orders for this visit:    1. Celiac disease (Primary)  -     Comprehensive Metabolic Panel  -     CBC & Differential  -     Iron Profile  -     Ferritin  -     Tissue Transglutaminase, IgA    2. Other iron deficiency anemia  -     Comprehensive Metabolic Panel  -     CBC & Differential  -     Iron Profile  -     Ferritin  -     Tissue Transglutaminase, IgA    3. Menorrhagia with irregular cycle  -     Comprehensive Metabolic Panel  -     CBC & Differential  -     Iron Profile  -     Ferritin  -     Tissue Transglutaminase, IgA        I have discussed the above plan with the patient.  They verbalize understanding and are in agreement with the plan.  They have been advised to contact the office for any questions, concerns, or changes related to their health.    Dictated utilizing Dragon dictation

## 2021-05-13 LAB
ALBUMIN SERPL-MCNC: 4.4 G/DL (ref 3.5–5.2)
ALBUMIN/GLOB SERPL: 2.4 G/DL
ALP SERPL-CCNC: 60 U/L (ref 39–117)
ALT SERPL-CCNC: 13 U/L (ref 1–33)
AST SERPL-CCNC: 16 U/L (ref 1–32)
BASOPHILS # BLD MANUAL: 0.06 10*3/MM3 (ref 0–0.2)
BASOPHILS NFR BLD MANUAL: 1 % (ref 0–1.5)
BILIRUB SERPL-MCNC: 0.2 MG/DL (ref 0–1.2)
BUN SERPL-MCNC: 14 MG/DL (ref 6–20)
BUN/CREAT SERPL: 17.1 (ref 7–25)
CALCIUM SERPL-MCNC: 9.5 MG/DL (ref 8.6–10.5)
CHLORIDE SERPL-SCNC: 105 MMOL/L (ref 98–107)
CO2 SERPL-SCNC: 26.8 MMOL/L (ref 22–29)
CREAT SERPL-MCNC: 0.82 MG/DL (ref 0.57–1)
DIFFERENTIAL COMMENT: ABNORMAL
ERYTHROCYTE [DISTWIDTH] IN BLOOD BY AUTOMATED COUNT: 16.8 % (ref 12.3–15.4)
FERRITIN SERPL-MCNC: 5 NG/ML (ref 13–150)
GLOBULIN SER CALC-MCNC: 1.8 GM/DL
GLUCOSE SERPL-MCNC: 90 MG/DL (ref 65–99)
HCT VFR BLD AUTO: 35.6 % (ref 34–46.6)
HGB BLD-MCNC: 10.6 G/DL (ref 12–15.9)
IRON SATN MFR SERPL: 4 % (ref 20–50)
IRON SERPL-MCNC: 18 MCG/DL (ref 37–145)
LYMPHOCYTES # BLD MANUAL: 0.82 10*3/MM3 (ref 0.7–3.1)
LYMPHOCYTES NFR BLD MANUAL: 14.6 % (ref 19.6–45.3)
MCH RBC QN AUTO: 21.6 PG (ref 26.6–33)
MCHC RBC AUTO-ENTMCNC: 29.8 G/DL (ref 31.5–35.7)
MCV RBC AUTO: 72.5 FL (ref 79–97)
MONOCYTES # BLD MANUAL: 0.23 10*3/MM3 (ref 0.1–0.9)
MONOCYTES NFR BLD MANUAL: 4.2 % (ref 5–12)
NEUTROPHILS # BLD MANUAL: 4.48 10*3/MM3 (ref 1.7–7)
NEUTROPHILS NFR BLD MANUAL: 80.2 % (ref 42.7–76)
NRBC BLD AUTO-RTO: 0 /100 WBC (ref 0–0.2)
PLATELET # BLD AUTO: 328 10*3/MM3 (ref 140–450)
PLATELET BLD QL SMEAR: ABNORMAL
POTASSIUM SERPL-SCNC: 3.8 MMOL/L (ref 3.5–5.2)
PROT SERPL-MCNC: 6.2 G/DL (ref 6–8.5)
RBC # BLD AUTO: 4.91 10*6/MM3 (ref 3.77–5.28)
RBC MORPH BLD: ABNORMAL
SODIUM SERPL-SCNC: 142 MMOL/L (ref 136–145)
TIBC SERPL-MCNC: 436 MCG/DL
TTG IGA SER-ACNC: 6 U/ML (ref 0–3)
UIBC SERPL-MCNC: 418 MCG/DL (ref 112–346)
WBC # BLD AUTO: 5.59 10*3/MM3 (ref 3.4–10.8)

## 2021-05-14 NOTE — PROGRESS NOTES
Her celiac antibody, the tissue transglutaminase IgA, is only slightly high (at 6, when she was first diagnosed it was over 100, a year ago it was 15; normal is less than 3).  This is very good but I do recommend checking to make sure she is not getting any cross-contamination with 3 productsShe remains with a pretty good iron deficiency anemia and should be on iron supplementation as this is likely mostly due to menorrhagia rather than the celiac disease.    Liver, kidney and electrolyte labs are all in normal range

## 2021-05-17 ENCOUNTER — TELEPHONE (OUTPATIENT)
Dept: GASTROENTEROLOGY | Facility: CLINIC | Age: 51
End: 2021-05-17

## 2021-09-01 RX ORDER — VALACYCLOVIR HYDROCHLORIDE 500 MG/1
500 TABLET, FILM COATED ORAL 2 TIMES DAILY
Qty: 20 TABLET | Refills: 0 | Status: SHIPPED | OUTPATIENT
Start: 2021-09-01 | End: 2021-09-11

## 2021-11-08 ENCOUNTER — TELEPHONE (OUTPATIENT)
Dept: GASTROENTEROLOGY | Facility: CLINIC | Age: 51
End: 2021-11-08

## 2021-11-08 NOTE — TELEPHONE ENCOUNTER
Call to pt.   Advise that celiac suspected with path report of 1/21/19 and verified with lab work.     Verb understanding.

## 2021-11-09 RX ORDER — VALACYCLOVIR HYDROCHLORIDE 1 G/1
1000 TABLET, FILM COATED ORAL DAILY
Qty: 5 TABLET | Refills: 0 | Status: SHIPPED | OUTPATIENT
Start: 2021-11-09 | End: 2021-11-14

## 2022-01-17 ENCOUNTER — TELEPHONE (OUTPATIENT)
Dept: OBSTETRICS AND GYNECOLOGY | Facility: CLINIC | Age: 52
End: 2022-01-17

## 2022-01-17 RX ORDER — VALACYCLOVIR HYDROCHLORIDE 1 G/1
1000 TABLET, FILM COATED ORAL DAILY
Qty: 30 TABLET | Refills: 12 | Status: SHIPPED | OUTPATIENT
Start: 2022-01-17 | End: 2023-02-16

## 2022-01-17 NOTE — TELEPHONE ENCOUNTER
Patient having outbreak. Seems  to be happening every couple of months.Talked at one time about prescribing a daily pill. She was wondering of could try that. Pharmacy [facility]. Pt Ph [phone]

## 2022-03-28 ENCOUNTER — PROCEDURE VISIT (OUTPATIENT)
Dept: OBSTETRICS AND GYNECOLOGY | Facility: CLINIC | Age: 52
End: 2022-03-28

## 2022-03-28 ENCOUNTER — OFFICE VISIT (OUTPATIENT)
Dept: OBSTETRICS AND GYNECOLOGY | Facility: CLINIC | Age: 52
End: 2022-03-28

## 2022-03-28 ENCOUNTER — APPOINTMENT (OUTPATIENT)
Dept: WOMENS IMAGING | Facility: HOSPITAL | Age: 52
End: 2022-03-28

## 2022-03-28 VITALS
WEIGHT: 165 LBS | BODY MASS INDEX: 28.17 KG/M2 | SYSTOLIC BLOOD PRESSURE: 102 MMHG | DIASTOLIC BLOOD PRESSURE: 60 MMHG | HEIGHT: 64 IN

## 2022-03-28 DIAGNOSIS — N95.1 PERIMENOPAUSAL: ICD-10-CM

## 2022-03-28 DIAGNOSIS — Z01.419 ENCOUNTER FOR GYNECOLOGICAL EXAMINATION WITHOUT ABNORMAL FINDING: Primary | ICD-10-CM

## 2022-03-28 DIAGNOSIS — Z12.31 VISIT FOR SCREENING MAMMOGRAM: Primary | ICD-10-CM

## 2022-03-28 PROCEDURE — 77067 SCR MAMMO BI INCL CAD: CPT | Performed by: RADIOLOGY

## 2022-03-28 PROCEDURE — 99396 PREV VISIT EST AGE 40-64: CPT | Performed by: OBSTETRICS & GYNECOLOGY

## 2022-03-28 PROCEDURE — 77067 SCR MAMMO BI INCL CAD: CPT | Performed by: OBSTETRICS & GYNECOLOGY

## 2022-03-28 PROCEDURE — 77063 BREAST TOMOSYNTHESIS BI: CPT | Performed by: RADIOLOGY

## 2022-03-28 PROCEDURE — 77063 BREAST TOMOSYNTHESIS BI: CPT | Performed by: OBSTETRICS & GYNECOLOGY

## 2022-03-28 NOTE — PROGRESS NOTES
Subjective    Chief Complaint   Patient presents with   • Gynecologic Exam     AE      History of Present Illness    Inez Juarez is a 52 y.o. female who presents for annual exam.  Non-smoker.  Mammogram today.  DEXA scan 2021 normal.  Colonoscopy .  Not due again for 10 years.  Patient had a very light.  Earlier this month but had not had one for several months.  Probably perimenopausal but will check an FSH.  No current problems.  Is on suppression for HSV 2.    Obstetric History:  OB History        3    Para   3    Term   3            AB        Living           SAB        IAB        Ectopic        Molar        Multiple        Live Births                   Menstrual History:     No LMP recorded.       Past Medical History:   Diagnosis Date   • Anemia    • Asthma     ALLERGY RELATED   • Celiac disease    • PONV (postoperative nausea and vomiting)      Family History   Problem Relation Age of Onset   • Uterine cancer Mother    • Heart attack Mother    • Hypertension Mother    • Depression Mother    • Arthritis Mother    • Cataracts Mother    • Colon polyps Mother    • Alcohol abuse Father    • COPD Father         smoker   • Depression Sister    • Lung disease Maternal Grandmother    • Stroke Paternal Grandmother    • Malig Hyperthermia Neg Hx      Social History     Tobacco Use   Smoking Status Never Smoker   Smokeless Tobacco Never Used         The following portions of the patient's history were reviewed and updated as appropriate: allergies, current medications, past family history, past medical history, past social history, past surgical history and problem list.    Review of Systems   Constitutional: Negative.  Negative for fever and unexpected weight change.   HENT: Negative.    Respiratory: Negative for shortness of breath and wheezing.    Cardiovascular: Negative for chest pain, palpitations and leg swelling.   Gastrointestinal: Negative for abdominal pain, anal bleeding and blood in  stool.   Genitourinary: Negative for dysuria, pelvic pain, urgency, vaginal bleeding, vaginal discharge and vaginal pain.   Skin: Negative.    Neurological: Negative.    Hematological: Negative.  Negative for adenopathy.   Psychiatric/Behavioral: Negative.  Negative for dysphoric mood. The patient is not nervous/anxious.             Objective   Physical Exam  Exam conducted with a chaperone present.   Constitutional:       Appearance: Normal appearance. She is well-developed.   HENT:      Head: Normocephalic.   Neck:      Thyroid: No thyromegaly.      Trachea: Trachea normal. No tracheal deviation.   Cardiovascular:      Rate and Rhythm: Normal rate and regular rhythm.      Heart sounds: Normal heart sounds. No murmur heard.  Pulmonary:      Effort: Pulmonary effort is normal.      Breath sounds: Normal breath sounds.   Chest:   Breasts:      Right: Normal. No mass, nipple discharge, tenderness or axillary adenopathy.      Left: Normal. No mass, nipple discharge, tenderness or axillary adenopathy.       Abdominal:      Palpations: Abdomen is soft. There is no mass.      Tenderness: There is no abdominal tenderness.      Hernia: No hernia is present.   Genitourinary:     General: Normal vulva.      Labia:         Right: No tenderness or lesion.         Left: No tenderness or lesion.       Urethra: No prolapse or urethral lesion.      Vagina: Normal. No vaginal discharge or lesions.      Cervix: No cervical motion tenderness.      Uterus: Not enlarged and not tender.       Adnexa:         Right: No mass or tenderness.          Left: No mass or tenderness.        Rectum: Normal. No external hemorrhoid or internal hemorrhoid. Normal anal tone.      Comments: External genitalia normal   Lymphadenopathy:      Cervical: No cervical adenopathy.      Upper Body:      Right upper body: No axillary adenopathy.      Left upper body: No axillary adenopathy.   Skin:     General: Skin is warm and dry.      Findings: No rash.  "  Neurological:      Mental Status: She is alert and oriented to person, place, and time.   Psychiatric:         Behavior: Behavior normal.         /60   Ht 162.6 cm (64\")   Wt 74.8 kg (165 lb)   BMI 28.32 kg/m²     Assessment/Plan   Diagnoses and all orders for this visit:    1. Encounter for gynecological examination without abnormal finding (Primary)  -     IGP,rfx Aptima HPV All Pth    2. Perimenopausal  -     Follicle Stimulating Hormone        Mammogram.  Counseled about continuing vitamin D3 for osteoporosis prevention.  Return to office 1 year.  Patient will call for results of FSH.             "

## 2022-03-29 ENCOUNTER — TELEPHONE (OUTPATIENT)
Dept: OBSTETRICS AND GYNECOLOGY | Facility: CLINIC | Age: 52
End: 2022-03-29

## 2022-03-29 LAB
FSH SERPL-ACNC: 61.7 MIU/ML
SPECIMEN STATUS: NORMAL

## 2022-03-29 NOTE — PROGRESS NOTES
Please tell patient that her FSH is definitely postmenopausal.  She really should not have any further menstrual cycles.  If she does going forward she should give me a call.  Thank you.

## 2022-03-29 NOTE — TELEPHONE ENCOUNTER
----- Message from En Caballero MD sent at 3/29/2022  2:35 PM EDT -----  Please tell patient that her FSH is definitely postmenopausal.  She really should not have any further menstrual cycles.  If she does going forward she should give me a call.  Thank you.

## 2022-05-09 DIAGNOSIS — N95.0 PMB (POSTMENOPAUSAL BLEEDING): Primary | ICD-10-CM

## 2022-05-19 ENCOUNTER — TELEPHONE (OUTPATIENT)
Dept: FAMILY MEDICINE CLINIC | Facility: CLINIC | Age: 52
End: 2022-05-19

## 2022-05-19 RX ORDER — DICYCLOMINE HYDROCHLORIDE 10 MG/1
10 CAPSULE ORAL
Qty: 120 CAPSULE | Refills: 5 | OUTPATIENT
Start: 2022-05-19 | End: 2022-11-07

## 2022-05-19 NOTE — TELEPHONE ENCOUNTER
----- Message from Kathryn Dela Cruz sent at 5/19/2022 10:28 AM EDT -----  Regarding: FW: Celiac disease    ----- Message -----  From: Inez Juarez  Sent: 5/19/2022   8:17 AM EDT  To: [facility] [place] [facility]  Subject: Celiac disease                                   No, nothing

## 2022-06-14 ENCOUNTER — OFFICE VISIT (OUTPATIENT)
Dept: OBSTETRICS AND GYNECOLOGY | Facility: CLINIC | Age: 52
End: 2022-06-14

## 2022-06-14 VITALS
BODY MASS INDEX: 28 KG/M2 | SYSTOLIC BLOOD PRESSURE: 99 MMHG | WEIGHT: 164 LBS | HEIGHT: 64 IN | DIASTOLIC BLOOD PRESSURE: 63 MMHG

## 2022-06-14 DIAGNOSIS — N95.0 POSTMENOPAUSAL BLEEDING: Primary | ICD-10-CM

## 2022-06-14 PROCEDURE — 99213 OFFICE O/P EST LOW 20 MIN: CPT | Performed by: NURSE PRACTITIONER

## 2022-06-14 NOTE — PROGRESS NOTES
"Chief Complaint   Patient presents with   • Follow-up     Pt here for US results from 22      SUBJECTIVE:     Inez Juarez is a 52 y.o.  who presents with c/o PMB last month that lasted 3-4 days, describes as \"like a period\" This is a new problem. FSH 2022 was high indicating menopause. Prior to this her last episode of vaginal bleeding was in 2022. She has not yet had one full year of no menses. She denies pelvic pain, vaginal discharge, itching, odor, or dysuria.  Pt Mother has hx uterine cancer. She is not having hot flashes or night sweats. TVUS completed 22, she is here today to review results.     This is my first time meeting Inez Juarez  She is a patient of Dr. Armijos.      Past Medical History:   Diagnosis Date   • Anemia    • Asthma     ALLERGY RELATED   • Celiac disease    • PONV (postoperative nausea and vomiting)       Past Surgical History:   Procedure Laterality Date   •  SECTION      X2   • COLONOSCOPY N/A 2019    Procedure: COLONOSCOPY;  Surgeon: Sindhu Avilez MD;  Location: [facility] ENDOSCOPY;  Service: Gastroenterology   • CORRECTION HAMMER TOE  2018   • CORRECTION HAMMER TOE     • ENDOSCOPY N/A 2019    Procedure: ESOPHAGOGASTRODUODENOSCOPY;  Surgeon: Sindhu Avilez MD;  Location: [facility] ENDOSCOPY;  Service: Gastroenterology   • TUBAL ABDOMINAL LIGATION  2016   • TUBAL COAGULATION LAPAROSCOPIC N/A 3/31/2016    Procedure: TUBAL COAGULATION LAPAROSCOPIC;  Surgeon: En Caballero MD;  Location: [facility] OR;  Service:    • UPPER GASTROINTESTINAL ENDOSCOPY  2019      Social History     Tobacco Use   • Smoking status: Never Smoker   • Smokeless tobacco: Never Used   Substance Use Topics   • Alcohol use: Yes     Types: 1 Glasses of wine per week     Comment: occ   • Drug use: No     OB History    Para Term  AB Living   3 3 3         SAB IAB Ectopic Molar Multiple Live Births                    # Outcome Date GA Lbr " "[place]/2nd Weight Sex Delivery Anes PTL Lv   3 Term            2 Term            1 Term                 Review of Systems   Constitutional: Negative for chills, fatigue and fever.   Gastrointestinal: Negative for abdominal distention and abdominal pain.   Endocrine: Negative for cold intolerance and heat intolerance.   Genitourinary: Positive for menstrual problem and vaginal bleeding (now resolved). Negative for dyspareunia, dysuria, pelvic pain, vaginal discharge and vaginal pain.   Musculoskeletal: Negative for gait problem.       OBJECTIVE:   Vitals:    06/14/22 1439   BP: 99/63   Weight: 74.4 kg (164 lb)   Height: 162.6 cm (64\")        Physical Exam  Constitutional:       Appearance: Normal appearance.   Cardiovascular:      Rate and Rhythm: Normal rate.   Pulmonary:      Effort: Pulmonary effort is normal.   Musculoskeletal:      Cervical back: Normal range of motion.   Neurological:      General: No focal deficit present.      Mental Status: She is alert and oriented to person, place, and time.      Cranial Nerves: No cranial nerve deficit.   Skin:     General: Skin is warm and dry.   Vitals and nursing note reviewed.         Assessment/Plan    Diagnoses and all orders for this visit:    1. Postmenopausal bleeding (Primary)  -     Follicle Stimulating Hormone        She has had not further vaginal bleeding since last month  Reviewed TVUS results with pt ET 0.23cm-normal  Discussed EMB however given normal ET I do not think this is necessary at this time  Will repeat FSH.   Could be more perimenopause since she has not had one year of no menses yet.  Advised to continue to call with further episodes of vaginal bleeding, at that time will consider EMB if indicated    Follow up: PRN and annually     I spent 25 minutes caring for Inez on this date of service. This time includes time spent by me in the following activities: reviewing tests, obtaining and/or reviewing a separately obtained history, performing a " medically appropriate examination and/or evaluation, counseling and educating the patient/family/caregiver, ordering medications, tests, or procedures and documenting information in the medical record    Pamella De Paz, APRN  6/14/2022  19:50 EDT

## 2022-06-15 LAB
FSH SERPL-ACNC: 64.1 MIU/ML
SPECIMEN STATUS: NORMAL

## 2022-10-12 NOTE — TELEPHONE ENCOUNTER
I am assuming this is for suppression of herpes.  I have sent a prescription for 1 g Valtrex to her pharmacy for daily use.  MACKENZIE   never yes...

## 2023-02-16 RX ORDER — VALACYCLOVIR HYDROCHLORIDE 1 G/1
1000 TABLET, FILM COATED ORAL DAILY
Qty: 30 TABLET | Refills: 12 | Status: SHIPPED | OUTPATIENT
Start: 2023-02-16

## 2023-10-27 ENCOUNTER — PATIENT MESSAGE (OUTPATIENT)
Dept: OBSTETRICS AND GYNECOLOGY | Facility: CLINIC | Age: 53
End: 2023-10-27
Payer: COMMERCIAL

## 2023-10-27 RX ORDER — VALACYCLOVIR HYDROCHLORIDE 1 G/1
1000 TABLET, FILM COATED ORAL DAILY
Qty: 90 TABLET | Refills: 3 | Status: SHIPPED | OUTPATIENT
Start: 2023-10-27

## 2023-11-15 ENCOUNTER — TELEPHONE (OUTPATIENT)
Dept: GASTROENTEROLOGY | Facility: CLINIC | Age: 53
End: 2023-11-15
Payer: COMMERCIAL

## 2023-11-15 ENCOUNTER — PREP FOR SURGERY (OUTPATIENT)
Dept: OTHER | Facility: HOSPITAL | Age: 53
End: 2023-11-15
Payer: COMMERCIAL

## 2023-11-15 DIAGNOSIS — Z83.719 FH: COLON POLYPS: Primary | ICD-10-CM

## 2023-11-15 NOTE — TELEPHONE ENCOUNTER
LAST C/S 1/21/19 IN [facility]    NO PERSONAL HX OF POLYPS    FAMILY HX OF POLYPS    NO FAMILY HX OF COLON CA    NO ASA OR BLOOD THINNERS        LIST OF  MEDICATIONS    VALACYCLOVIR  PROBIOTIC  VITAMIN C   CALCIUM   VITAMIN D                        OA QUESTIONNAIRE SCANNED IN MEDIA

## 2023-11-16 ENCOUNTER — TELEPHONE (OUTPATIENT)
Dept: GASTROENTEROLOGY | Facility: CLINIC | Age: 53
End: 2023-11-16
Payer: COMMERCIAL

## 2023-11-16 PROBLEM — Z83.719 FH: COLON POLYPS: Status: ACTIVE | Noted: 2023-11-15

## 2023-11-16 NOTE — TELEPHONE ENCOUNTER
COLONOSCOPY on  3/13/2024  arrive at  9;00 . Sent prep instructions to pt mail address ....[email or web address]

## 2024-03-06 ENCOUNTER — TELEPHONE (OUTPATIENT)
Dept: GASTROENTEROLOGY | Facility: CLINIC | Age: 54
End: 2024-03-06

## 2024-03-06 NOTE — TELEPHONE ENCOUNTER
Hub staff attempted to follow warm transfer process and was unsuccessful     Caller: ANABEL BOLTON    Relationship to patient: SELF    Best call back number: [phone]    Patient is needing: QUESTIONS ABOUT BOWEL PREP FOR COLONOSCOPY HAPPENING ON 3.13

## 2024-03-13 ENCOUNTER — ANESTHESIA EVENT (OUTPATIENT)
Dept: GASTROENTEROLOGY | Facility: HOSPITAL | Age: 54
End: 2024-03-13
Payer: COMMERCIAL

## 2024-03-13 ENCOUNTER — HOSPITAL ENCOUNTER (OUTPATIENT)
Facility: HOSPITAL | Age: 54
Setting detail: HOSPITAL OUTPATIENT SURGERY
Discharge: HOME | End: 2024-03-13
Attending: INTERNAL MEDICINE | Admitting: INTERNAL MEDICINE
Payer: COMMERCIAL

## 2024-03-13 ENCOUNTER — ANESTHESIA (OUTPATIENT)
Dept: GASTROENTEROLOGY | Facility: HOSPITAL | Age: 54
End: 2024-03-13
Payer: COMMERCIAL

## 2024-03-13 VITALS
OXYGEN SATURATION: 100 % | SYSTOLIC BLOOD PRESSURE: 111 MMHG | WEIGHT: 154.6 LBS | DIASTOLIC BLOOD PRESSURE: 62 MMHG | RESPIRATION RATE: 13 BRPM | BODY MASS INDEX: 26.4 KG/M2 | TEMPERATURE: 98 F | HEIGHT: 64 IN | HEART RATE: 62 BPM

## 2024-03-13 DIAGNOSIS — Z83.719 FH: COLON POLYPS: ICD-10-CM

## 2024-03-13 PROCEDURE — 45385 COLONOSCOPY W/LESION REMOVAL: CPT | Performed by: INTERNAL MEDICINE

## 2024-03-13 PROCEDURE — S0260 H&P FOR SURGERY: HCPCS | Performed by: INTERNAL MEDICINE

## 2024-03-13 PROCEDURE — 25810000003 LACTATED RINGERS PER 1000 ML: Performed by: INTERNAL MEDICINE

## 2024-03-13 PROCEDURE — 88305 TISSUE EXAM BY PATHOLOGIST: CPT | Performed by: INTERNAL MEDICINE

## 2024-03-13 PROCEDURE — 25010000002 PROPOFOL 10 MG/ML EMULSION

## 2024-03-13 RX ORDER — PROPOFOL 10 MG/ML
VIAL (ML) INTRAVENOUS AS NEEDED
Status: DISCONTINUED | OUTPATIENT
Start: 2024-03-13 | End: 2024-03-13 | Stop reason: SURG

## 2024-03-13 RX ORDER — SODIUM CHLORIDE, SODIUM LACTATE, POTASSIUM CHLORIDE, CALCIUM CHLORIDE 600; 310; 30; 20 MG/100ML; MG/100ML; MG/100ML; MG/100ML
1000 INJECTION, SOLUTION INTRAVENOUS CONTINUOUS
Status: DISCONTINUED | OUTPATIENT
Start: 2024-03-13 | End: 2024-03-13 | Stop reason: HOSPADM

## 2024-03-13 RX ORDER — LIDOCAINE HYDROCHLORIDE 10 MG/ML
0.5 INJECTION, SOLUTION INFILTRATION; PERINEURAL ONCE AS NEEDED
Status: DISCONTINUED | OUTPATIENT
Start: 2024-03-13 | End: 2024-03-13 | Stop reason: HOSPADM

## 2024-03-13 RX ORDER — SODIUM CHLORIDE 0.9 % (FLUSH) 0.9 %
10 SYRINGE (ML) INJECTION AS NEEDED
Status: DISCONTINUED | OUTPATIENT
Start: 2024-03-13 | End: 2024-03-13 | Stop reason: HOSPADM

## 2024-03-13 RX ORDER — LIDOCAINE HYDROCHLORIDE 20 MG/ML
INJECTION, SOLUTION INFILTRATION; PERINEURAL AS NEEDED
Status: DISCONTINUED | OUTPATIENT
Start: 2024-03-13 | End: 2024-03-13 | Stop reason: SURG

## 2024-03-13 RX ADMIN — SODIUM CHLORIDE, POTASSIUM CHLORIDE, SODIUM LACTATE AND CALCIUM CHLORIDE 1000 ML: 600; 310; 30; 20 INJECTION, SOLUTION INTRAVENOUS at 09:57

## 2024-03-13 RX ADMIN — PROPOFOL 200 MCG/KG/MIN: 10 INJECTION, EMULSION INTRAVENOUS at 10:59

## 2024-03-13 RX ADMIN — LIDOCAINE HYDROCHLORIDE 60 MG: 20 INJECTION, SOLUTION INFILTRATION; PERINEURAL at 10:58

## 2024-03-13 RX ADMIN — PROPOFOL 70 MG: 10 INJECTION, EMULSION INTRAVENOUS at 10:58

## 2024-03-13 NOTE — H&P
[facility]  Pre Procedure History & Physical    Chief Complaint:   Screening- FH polyps    Subjective     HPI:   53 yo here today for colonoscopy.  Pt reports + FH polyps (mother).  Patient denies GI symptoms currently.  Last exam     Past Medical History:   Past Medical History:   Diagnosis Date    Anemia     Asthma     ALLERGY RELATED    Celiac disease     PONV (postoperative nausea and vomiting)        Past Surgical History:  Past Surgical History:   Procedure Laterality Date     SECTION      X2    COLONOSCOPY N/A 2019    Procedure: COLONOSCOPY;  Surgeon: Sindhu Avilez MD;  Location:  [facility];  Service: Gastroenterology    CORRECTION HAMMER TOE  2018    CORRECTION HAMMER TOE      ENDOSCOPY N/A 2019    Procedure: ESOPHAGOGASTRODUODENOSCOPY;  Surgeon: Sindhu Avilez MD;  Location:  [facility];  Service: Gastroenterology    TUBAL ABDOMINAL LIGATION  2016    TUBAL COAGULATION LAPAROSCOPIC N/A 3/31/2016    Procedure: TUBAL COAGULATION LAPAROSCOPIC;  Surgeon: En Caballero MD;  Location: [facility];  Service:     UPPER GASTROINTESTINAL ENDOSCOPY  2019       Family History:  Family History   Problem Relation Age of Onset    Uterine cancer Mother     Heart attack Mother     Hypertension Mother     Depression Mother     Arthritis Mother     Cataracts Mother     Colon polyps Mother     Alcohol abuse Father     COPD Father         smoker    Depression Sister     Lung disease Maternal Grandmother     Stroke Paternal Grandmother     Malig Hyperthermia Neg Hx        Social History:   reports that she has never smoked. She has never used smokeless tobacco. She reports current alcohol use. She reports that she does not use drugs.    Medications:   Medications Prior to Admission   Medication Sig Dispense Refill Last Dose    Ascorbic Acid (VITAMIN C PO) Take  by mouth.   3/11/2024    BIOTIN PO Take  by mouth.   3/11/2024    ibuprofen  "(ADVIL,MOTRIN) 200 MG tablet Take 1 tablet by mouth Every 6 (Six) Hours As Needed for Mild Pain.   3/10/2024    Probiotic Product (PROBIOTIC PO) Take  by mouth.   3/11/2024    TURMERIC PO Take  by mouth.   3/6/2024    valACYclovir (VALTREX) 1000 MG tablet Take 1 tablet by mouth Daily. 90 tablet 3 3/11/2024    VITAMIN D PO Take  by mouth.   3/11/2024    amoxicillin-clavulanate (AUGMENTIN) 875-125 MG per tablet Take 1 tablet by mouth 2 (Two) Times a Day. 20 tablet 0 More than a month    Ginkgo Biloba (GNP GINGKO BILOBA EXTRACT PO) Take  by mouth Daily.   More than a month    IRON-VITAMINS PO Take 1 tablet by mouth Daily.   More than a month    L-LYSINE PO Take  by mouth.   More than a month       Allergies:  Patient has no known allergies.    ROS:    Pertinent items are noted in HPI     Objective     Blood pressure 110/68, pulse 61, temperature 98 °F (36.7 °C), temperature source Oral, resp. rate 16, height 162.6 cm (64\"), weight 70.1 kg (154 lb 9.6 oz), last menstrual period 12/28/2018, SpO2 99%, not currently breastfeeding.    Physical Exam   Constitutional: Pt is oriented to person, place, and time and well-developed, well-nourished, and in no distress.   Mouth/Throat: Oropharynx is clear and moist.   Neck: Normal range of motion.   Cardiovascular: Normal rate, regular rhythm    Pulmonary/Chest: Effort normal    Abdominal: Soft. Nontender  Skin: Skin is warm and dry.   Psychiatric: Mood, memory, affect and judgment normal.     Assessment & Plan     Diagnosis:  Screening for colon cancer-FH polyps    Anticipated Surgical Procedure:  colonoscopy    The risks, benefits, and alternatives of this procedure have been discussed with the patient or the responsible party- the patient understands and agrees to proceed.                                                              "

## 2024-03-13 NOTE — ANESTHESIA PREPROCEDURE EVALUATION
Anesthesia Evaluation     Patient summary reviewed and Nursing notes reviewed   history of anesthetic complications:  PONV  NPO Solid Status: > 8 hours  NPO Liquid Status: > 4 hours           Airway   Mallampati: II  Neck ROM: full  No difficulty expected  Dental      Comment: Awaiting implant    Pulmonary     breath sounds clear to auscultation  (+) asthma,  Cardiovascular     Rhythm: regular        Neuro/Psych  GI/Hepatic/Renal/Endo      Musculoskeletal     Abdominal    Substance History      OB/GYN          Other   blood dyscrasia anemia,                 Anesthesia Plan    ASA 2     MAC     intravenous induction     Anesthetic plan, risks, benefits, and alternatives have been provided, discussed and informed consent has been obtained with: patient.    CODE STATUS:

## 2024-03-13 NOTE — ANESTHESIA POSTPROCEDURE EVALUATION
"Patient: Inez Juarez    Procedure Summary       Date: 03/13/24 Room / Location:  [facility] /  [facility]    Anesthesia Start: 1055 Anesthesia Stop: 1118    Procedure: COLONOSCOPY TO CECUM WITH COLD SNARE POLYPECTOMIES Diagnosis:       FH: colon polyps      (FH: colon polyps [Z83.719])    Surgeons: Sheridan Sullivan MD Provider: Viktor Silver MD    Anesthesia Type: MAC ASA Status: 2            Anesthesia Type: MAC    Vitals  Vitals Value Taken Time   /62 03/13/24 1138   Temp     Pulse 60 03/13/24 1141   Resp 13 03/13/24 1138   SpO2 100 % 03/13/24 1141   Vitals shown include unfiled device data.        Post Anesthesia Care and Evaluation    Patient location during evaluation: bedside  Patient participation: complete - patient participated  Level of consciousness: sleepy but conscious  Pain score: 0  Pain management: adequate    Airway patency: patent  Anesthetic complications: No anesthetic complications    Cardiovascular status: acceptable  Respiratory status: acceptable  Hydration status: acceptable    Comments: /62 (BP Location: Left arm, Patient Position: Lying)   Pulse 62   Temp 36.7 °C (98 °F) (Oral)   Resp 13   Ht 162.6 cm (64\")   Wt 70.1 kg (154 lb 9.6 oz)   LMP 12/28/2018   SpO2 100%   BMI 26.54 kg/m²       "

## 2024-03-14 LAB
LAB AP CASE REPORT: NORMAL
PATH REPORT.FINAL DX SPEC: NORMAL
PATH REPORT.GROSS SPEC: NORMAL

## 2024-03-14 NOTE — PROGRESS NOTES
The polyp(s) showed hyperplastic change, which is non-cancerous and not pre-cancerous. Follow-up colonoscopy in 5 years is advised due to family history.

## 2024-03-15 ENCOUNTER — TELEPHONE (OUTPATIENT)
Dept: GASTROENTEROLOGY | Facility: CLINIC | Age: 54
End: 2024-03-15
Payer: COMMERCIAL

## 2024-03-15 NOTE — TELEPHONE ENCOUNTER
Pt reviewed results and Dr Tadeo' note and recommendations via [employer].     Colonoscopy placed in  and recall for 3/13/29.

## 2024-03-15 NOTE — TELEPHONE ENCOUNTER
----- Message from Sheridan Sullivan MD sent at 3/14/2024  7:29 PM EDT -----  The polyp(s) showed hyperplastic change, which is non-cancerous and not pre-cancerous. Follow-up colonoscopy in 5 years is advised due to family history.

## 2024-04-04 ENCOUNTER — OFFICE VISIT (OUTPATIENT)
Dept: FAMILY MEDICINE CLINIC | Facility: CLINIC | Age: 54
End: 2024-04-04
Payer: COMMERCIAL

## 2024-04-04 VITALS
WEIGHT: 160 LBS | BODY MASS INDEX: 27.31 KG/M2 | OXYGEN SATURATION: 100 % | HEIGHT: 64 IN | DIASTOLIC BLOOD PRESSURE: 62 MMHG | HEART RATE: 66 BPM | RESPIRATION RATE: 16 BRPM | SYSTOLIC BLOOD PRESSURE: 100 MMHG

## 2024-04-04 DIAGNOSIS — M77.9 TENDONITIS: Primary | ICD-10-CM

## 2024-04-04 DIAGNOSIS — Z00.00 LABORATORY EXAM ORDERED AS PART OF ROUTINE GENERAL MEDICAL EXAMINATION: ICD-10-CM

## 2024-04-04 NOTE — PROGRESS NOTES
Subjective   Inez Juarez is a 54 y.o. female.     Hand Pain   Pertinent negatives include no chest pain.      Inez Juarez 54 y.o. female who presents today for a new patient appointment.    she has a history of   Patient Active Problem List   Diagnosis    Dysuria    Acute cystitis without hematuria    Iron deficiency anemia    Family history of polyps in the colon    Celiac disease    FH: colon polyps   .  she is here to re-establish care I reviewed the PFSH recorded today by my MA/LPN staff.   she   She has been feeling fairly well.    Hand Pain (L thumb pain, came on suddenly, pain does not go into wrist or all the way up thumb.) Thinks is may be from holding her phone.        The following portions of the patient's history were reviewed and updated as appropriate: allergies, current medications, past family history, past medical history, past social history, past surgical history, and problem list.    Review of Systems   Constitutional:  Negative for unexpected weight change.   Respiratory:  Negative for shortness of breath.    Cardiovascular:  Negative for chest pain and palpitations.   Psychiatric/Behavioral:  Negative for behavioral problems.        Objective   Physical Exam  Vitals and nursing note reviewed.   Constitutional:       Appearance: Normal appearance. She is well-developed.   Neck:      Vascular: No carotid bruit.   Cardiovascular:      Rate and Rhythm: Normal rate and regular rhythm.   Pulmonary:      Effort: Pulmonary effort is normal.      Breath sounds: Normal breath sounds.   Neurological:      Mental Status: She is alert and oriented to person, place, and time.   Psychiatric:         Mood and Affect: Mood normal.         Behavior: Behavior normal.         Thought Content: Thought content normal.         Judgment: Judgment normal.         Assessment & Plan   Diagnoses and all orders for this visit:    1. Tendonitis (Primary)  -     Ibuprofen 3 %, Gabapentin 10 %, Baclofen 2 %, lidocaine 4 %;  Apply 1-2 g topically to the appropriate area as directed 3 (Three) to 4 (Four) times daily.  Dispense: 90 g; Refill: 1    2. Laboratory exam ordered as part of routine general medical examination  -     Comprehensive metabolic panel  -     Lipid panel  -     CBC and Differential  -     TSH

## 2024-04-09 LAB
ALBUMIN SERPL-MCNC: 4.4 G/DL (ref 3.5–5.2)
ALBUMIN/GLOB SERPL: 2.2 G/DL
ALP SERPL-CCNC: 78 U/L (ref 39–117)
ALT SERPL-CCNC: 18 U/L (ref 1–33)
AST SERPL-CCNC: 16 U/L (ref 1–32)
BASOPHILS # BLD AUTO: 0.04 10*3/MM3 (ref 0–0.2)
BASOPHILS NFR BLD AUTO: 0.8 % (ref 0–1.5)
BILIRUB SERPL-MCNC: 0.4 MG/DL (ref 0–1.2)
BUN SERPL-MCNC: 22 MG/DL (ref 6–20)
BUN/CREAT SERPL: 26.5 (ref 7–25)
CALCIUM SERPL-MCNC: 9.4 MG/DL (ref 8.6–10.5)
CHLORIDE SERPL-SCNC: 104 MMOL/L (ref 98–107)
CHOLEST SERPL-MCNC: 189 MG/DL (ref 0–200)
CO2 SERPL-SCNC: 28.4 MMOL/L (ref 22–29)
CREAT SERPL-MCNC: 0.83 MG/DL (ref 0.57–1)
EGFRCR SERPLBLD CKD-EPI 2021: 83.9 ML/MIN/1.73
EOSINOPHIL # BLD AUTO: 0.09 10*3/MM3 (ref 0–0.4)
EOSINOPHIL NFR BLD AUTO: 1.7 % (ref 0.3–6.2)
ERYTHROCYTE [DISTWIDTH] IN BLOOD BY AUTOMATED COUNT: 14.2 % (ref 12.3–15.4)
GLOBULIN SER CALC-MCNC: 2 GM/DL
GLUCOSE SERPL-MCNC: 87 MG/DL (ref 65–99)
HCT VFR BLD AUTO: 37.9 % (ref 34–46.6)
HDLC SERPL-MCNC: 58 MG/DL (ref 40–60)
HGB BLD-MCNC: 11.9 G/DL (ref 12–15.9)
IMM GRANULOCYTES # BLD AUTO: 0.02 10*3/MM3 (ref 0–0.05)
IMM GRANULOCYTES NFR BLD AUTO: 0.4 % (ref 0–0.5)
LDLC SERPL CALC-MCNC: 119 MG/DL (ref 0–100)
LYMPHOCYTES # BLD AUTO: 1.3 10*3/MM3 (ref 0.7–3.1)
LYMPHOCYTES NFR BLD AUTO: 25.2 % (ref 19.6–45.3)
MCH RBC QN AUTO: 25.9 PG (ref 26.6–33)
MCHC RBC AUTO-ENTMCNC: 31.4 G/DL (ref 31.5–35.7)
MCV RBC AUTO: 82.6 FL (ref 79–97)
MONOCYTES # BLD AUTO: 0.44 10*3/MM3 (ref 0.1–0.9)
MONOCYTES NFR BLD AUTO: 8.5 % (ref 5–12)
NEUTROPHILS # BLD AUTO: 3.27 10*3/MM3 (ref 1.7–7)
NEUTROPHILS NFR BLD AUTO: 63.4 % (ref 42.7–76)
NRBC BLD AUTO-RTO: 0 /100 WBC (ref 0–0.2)
PLATELET # BLD AUTO: 292 10*3/MM3 (ref 140–450)
POTASSIUM SERPL-SCNC: 4.3 MMOL/L (ref 3.5–5.2)
PROT SERPL-MCNC: 6.4 G/DL (ref 6–8.5)
RBC # BLD AUTO: 4.59 10*6/MM3 (ref 3.77–5.28)
SODIUM SERPL-SCNC: 141 MMOL/L (ref 136–145)
TRIGL SERPL-MCNC: 66 MG/DL (ref 0–150)
TSH SERPL DL<=0.005 MIU/L-ACNC: 2.68 UIU/ML (ref 0.27–4.2)
VLDLC SERPL CALC-MCNC: 12 MG/DL (ref 5–40)
WBC # BLD AUTO: 5.16 10*3/MM3 (ref 3.4–10.8)

## 2024-07-09 NOTE — PROGRESS NOTES
GYN ANNUAL EXAM   Chief Complaint   Patient presents with    Annual Exam     AE and last pap  normal,MX today       JAYDEN Wiley is a 54 y.o. female who presents for annual well woman exam. She is a patient of Dr. Caballero.     OB History          3    Para   3    Term   3            AB        Living             SAB        IAB        Ectopic        Molar        Multiple        Live Births                    SUBJECTIVE    MENSTRUAL & SEXUAL HEALTH  LMP: Patient's last menstrual period was 2018.  Menses regularity: postmenopausal status  Menses length: postmenopausal status  Dysmenorrhea: postmenopausal status  Cyclic symptoms: postmenopausal status  Current contraception: post menopausal status  Last pap: , Normal PAP  History of abnormal pap: no  History of STD: No  Family history of cancer: uterine cancer       Family history of breast cancer: no  Performs SBE: performs monthly.  Mammogram: , Birads II (Benign findings).  History of abnormal mammogram: no  Colonoscopy: , repeat in 10 years  Bleeding since LMP: no  Vasomotor symptoms: yes  HRT: no  Incontinence: yes  Dyspareunia: no    Past Medical History:   Diagnosis Date    Anemia     Asthma     ALLERGY RELATED    Celiac disease     PONV (postoperative nausea and vomiting)     Urinary tract infection     Not sure       Past Surgical History:   Procedure Laterality Date     SECTION      X2    COLONOSCOPY N/A 2019    Procedure: COLONOSCOPY;  Surgeon: Sindhu Avilez MD;  Location: [facility];  Service: Gastroenterology    COLONOSCOPY N/A 3/13/2024    Procedure: COLONOSCOPY TO CECUM WITH COLD SNARE POLYPECTOMIES;  Surgeon: Sheridan Sullivan MD;  Location:  [facility];  Service: Gastroenterology;  Laterality: N/A;  PRE: family hx of polyps  POST: hemorrhoids, polyps    CORRECTION HAMMER TOE  2018    CORRECTION HAMMER TOE      ENDOSCOPY N/A 2019    Procedure: ESOPHAGOGASTRODUODENOSCOPY;  Surgeon:  Sindhu Avilez MD;  Location: [facility];  Service: Gastroenterology    TUBAL ABDOMINAL LIGATION  03/31/2016    TUBAL COAGULATION LAPAROSCOPIC N/A 3/31/2016    Procedure: TUBAL COAGULATION LAPAROSCOPIC;  Surgeon: En Caballero MD;  Location: [facility] OR;  Service:     UPPER GASTROINTESTINAL ENDOSCOPY  1/21/2019         Current Outpatient Medications:     Ascorbic Acid (VITAMIN C PO), Take  by mouth., Disp: , Rfl:     BIOTIN PO, Take  by mouth., Disp: , Rfl:     ibuprofen (ADVIL,MOTRIN) 200 MG tablet, Take 1 tablet by mouth Every 6 (Six) Hours As Needed for Mild Pain., Disp: , Rfl:     Probiotic Product (PROBIOTIC PO), Take  by mouth., Disp: , Rfl:     valACYclovir (VALTREX) 1000 MG tablet, Take 1 tablet by mouth Daily., Disp: 90 tablet, Rfl: 3    VITAMIN D PO, Take  by mouth., Disp: , Rfl:     Ibuprofen 3 %, Gabapentin 10 %, Baclofen 2 %, lidocaine 4 %, Apply 1-2 g topically to the appropriate area as directed 3 (Three) to 4 (Four) times daily. (Patient not taking: Reported on 7/10/2024), Disp: 90 g, Rfl: 1    IRON-VITAMINS PO, Take 1 tablet by mouth Daily. (Patient not taking: Reported on 7/10/2024), Disp: , Rfl:     L-LYSINE PO, Take  by mouth., Disp: , Rfl:     TURMERIC PO, Take  by mouth. (Patient not taking: Reported on 7/10/2024), Disp: , Rfl:     No Known Allergies    Social History     Tobacco Use    Smoking status: Never    Smokeless tobacco: Never   Vaping Use    Vaping status: Never Used   Substance Use Topics    Alcohol use: Yes     Alcohol/week: 1.0 standard drink of alcohol     Types: 1 Glasses of wine per week     Comment: occ    Drug use: No       Family History   Problem Relation Age of Onset    Uterine cancer Mother     Heart attack Mother     Hypertension Mother     Depression Mother     Arthritis Mother     Cataracts Mother     Colon polyps Mother     Alcohol abuse Father     COPD Father         smoker    Depression Sister     Lung disease Maternal Grandmother     Stroke Paternal  "Grandmother     Malig Hyperthermia Neg Hx        Review of Systems   Constitutional:  Negative for fatigue, unexpected weight gain and unexpected weight loss.   Gastrointestinal:  Negative for abdominal pain.   Genitourinary:  Negative for decreased libido, difficulty urinating, dyspareunia, dysuria, pelvic pain, pelvic pressure, urgency, urinary incontinence and vaginal discharge.   All other systems reviewed and are negative.      OBJECTIVE    BP 96/60   Ht 162.6 cm (64.02\")   Wt 70.8 kg (156 lb)   LMP 12/28/2018   BMI 26.76 kg/m²     Physical Exam  Constitutional:       General: She is awake. She is not in acute distress.     Appearance: Normal appearance. She is well-developed and well-groomed. She is not ill-appearing.   Genitourinary:      Vulva, bladder and urethral meatus normal.      Right Labia: No rash, tenderness, lesions or skin changes.     Left Labia: No tenderness, lesions, skin changes or rash.     No labial fusion noted.      No inguinal adenopathy present in the right or left side.     No vaginal discharge, erythema, tenderness, bleeding, ulceration or granulation tissue.      No vaginal prolapse present.     No vaginal atrophy present.     No cervical discharge, friability, lesion, polyp, eversion or elongation.      Uterus is not enlarged, tender or prolapsed.      Pelvic exam was performed with patient in the lithotomy position.   Breasts:     Breasts are symmetrical.      Breasts are soft.     Right: Normal.      Left: Normal.   HENT:      Head: Normocephalic and atraumatic.   Eyes:      General: No scleral icterus.     Conjunctiva/sclera: Conjunctivae normal.   Cardiovascular:      Rate and Rhythm: Normal rate and regular rhythm.      Heart sounds: Normal heart sounds.   Pulmonary:      Effort: Pulmonary effort is normal.      Breath sounds: Normal breath sounds.   Abdominal:      Palpations: Abdomen is soft.      Hernia: There is no hernia in the left inguinal area or right inguinal " area.   Musculoskeletal:         General: Normal range of motion.      Cervical back: Normal range of motion.   Lymphadenopathy:      Lower Body: No right inguinal adenopathy. No left inguinal adenopathy.   Neurological:      Mental Status: She is alert.   Skin:     General: Skin is warm and dry.      Coloration: Skin is not jaundiced or pale.   Psychiatric:         Behavior: Behavior normal. Behavior is cooperative.   Vitals reviewed.         ASSESSMENT    Diagnoses and all orders for this visit:    1. Encounter for gynecological examination without abnormal finding (Primary)  -     IGP, Apt HPV,rfx 16 / 18,45    2. SANJEEV (stress urinary incontinence, female)  -     Ambulatory Referral to Physical Therapy for Evaluation & Treatment         PLAN   WELL WOMAN EXAM: Pap smear collected today. Recommend MVI daily.    SMOKING STATUS: non smoker.  BONE HEALTH: weight bearing exercise, dietary calcium recommendations and vitamin D reviewed.  COLON HEALTH: screening colonoscopy or Cologuard recommended.  BREAST HEALTH: Encouraged annual mammogram screening starting at age 40. Instructed on how to perform SBE. Encouraged breast health self awareness.  EXERCISE: Encouraged 150 minutes of exercise per week if not medially contraindicated.   BMI: Body mass index is 26.76 kg/m².     Return in about 1 year (around 7/10/2025) for annual physical.    I spent 15 minutes caring for Inez on this date of service. This time includes time spent by me in the following activities: preparing for the visit, reviewing tests, performing a medically appropriate examination and/or evaluation, counseling and educating the patient/family/caregiver, referring and communicating with other health care professionals, documenting information in the medical record, independently interpreting results and communicating that information with the patient/family/caregiver, care coordination, ordering medications, ordering test(s), ordering procedure(s),  obtaining a separately obtained history, and reviewing a separately obtained history.    Jen Merritt CNM  7/10/2024  13:12 EDT

## 2024-07-10 ENCOUNTER — OFFICE VISIT (OUTPATIENT)
Dept: OBSTETRICS AND GYNECOLOGY | Facility: CLINIC | Age: 54
End: 2024-07-10
Payer: COMMERCIAL

## 2024-07-10 ENCOUNTER — PROCEDURE VISIT (OUTPATIENT)
Dept: OBSTETRICS AND GYNECOLOGY | Facility: CLINIC | Age: 54
End: 2024-07-10
Payer: COMMERCIAL

## 2024-07-10 VITALS
SYSTOLIC BLOOD PRESSURE: 96 MMHG | DIASTOLIC BLOOD PRESSURE: 60 MMHG | BODY MASS INDEX: 26.63 KG/M2 | HEIGHT: 64 IN | WEIGHT: 156 LBS

## 2024-07-10 DIAGNOSIS — N39.3 SUI (STRESS URINARY INCONTINENCE, FEMALE): ICD-10-CM

## 2024-07-10 DIAGNOSIS — Z12.31 VISIT FOR SCREENING MAMMOGRAM: Primary | ICD-10-CM

## 2024-07-10 DIAGNOSIS — Z01.419 ENCOUNTER FOR GYNECOLOGICAL EXAMINATION WITHOUT ABNORMAL FINDING: Primary | ICD-10-CM

## 2024-07-12 LAB
CYTOLOGIST CVX/VAG CYTO: NORMAL
CYTOLOGY CVX/VAG DOC CYTO: NORMAL
CYTOLOGY CVX/VAG DOC THIN PREP: NORMAL
DX ICD CODE: NORMAL
HPV I/H RISK 4 DNA CVX QL PROBE+SIG AMP: NEGATIVE
Lab: NORMAL
OTHER STN SPEC: NORMAL
STAT OF ADQ CVX/VAG CYTO-IMP: NORMAL

## 2024-08-06 ENCOUNTER — HOSPITAL ENCOUNTER (OUTPATIENT)
Dept: PHYSICAL THERAPY | Facility: HOSPITAL | Age: 54
Setting detail: THERAPIES SERIES
Discharge: HOME | End: 2024-08-06
Payer: COMMERCIAL

## 2024-08-06 DIAGNOSIS — M62.89 PELVIC FLOOR DYSFUNCTION: ICD-10-CM

## 2024-08-06 DIAGNOSIS — N39.3 STRESS INCONTINENCE: Primary | ICD-10-CM

## 2024-08-06 DIAGNOSIS — N81.89 PELVIC FLOOR WEAKNESS: ICD-10-CM

## 2024-08-06 DIAGNOSIS — R35.0 URINARY FREQUENCY: ICD-10-CM

## 2024-08-06 PROCEDURE — 97530 THERAPEUTIC ACTIVITIES: CPT

## 2024-08-06 PROCEDURE — 97161 PT EVAL LOW COMPLEX 20 MIN: CPT

## 2024-08-06 NOTE — THERAPY EVALUATION
Outpatient Physical Therapy Ortho Initial Evaluation  [facility]     Patient Name: Inez HERNANDEZ Larry  : 1970  MRN: [id number]  Today's Date: 2024      Visit Date: 2024    Patient Active Problem List   Diagnosis    Dysuria    Acute cystitis without hematuria    Iron deficiency anemia    Family history of polyps in the colon    Celiac disease    FH: colon polyps        Past Medical History:   Diagnosis Date    Anemia     Asthma     ALLERGY RELATED    Celiac disease     PONV (postoperative nausea and vomiting)     Urinary tract infection     Not sure        Past Surgical History:   Procedure Laterality Date     SECTION      X2    COLONOSCOPY N/A 2019    Procedure: COLONOSCOPY;  Surgeon: Sindhu Avilez MD;  Location: [facility] ENDOSCOPY;  Service: Gastroenterology    COLONOSCOPY N/A 3/13/2024    Procedure: COLONOSCOPY TO CECUM WITH COLD SNARE POLYPECTOMIES;  Surgeon: Sheridan Sullivan MD;  Location: [facility] ENDOSCOPY;  Service: Gastroenterology;  Laterality: N/A;  PRE: family hx of polyps  POST: hemorrhoids, polyps    CORRECTION HAMMER TOE  2018    CORRECTION HAMMER TOE      ENDOSCOPY N/A 2019    Procedure: ESOPHAGOGASTRODUODENOSCOPY;  Surgeon: Sindhu Avilez MD;  Location: [facility] ENDOSCOPY;  Service: Gastroenterology    TUBAL ABDOMINAL LIGATION  2016    TUBAL COAGULATION LAPAROSCOPIC N/A 3/31/2016    Procedure: TUBAL COAGULATION LAPAROSCOPIC;  Surgeon: En Caballero MD;  Location: [facility] MAIN OR;  Service:     UPPER GASTROINTESTINAL ENDOSCOPY  2019       Visit Dx:     ICD-10-CM ICD-9-CM   1. Stress incontinence  N39.3 [id number]   2. Pelvic floor dysfunction  M62.89 618.83   3. Pelvic floor weakness  N81.89 618.89   4. Urinary frequency  R35.0 788.41          Patient History       Row Name 24 1400 24 0718          History    Chief Complaint -- Other 1 (comment) (P)   -patient     Hx Chief Complaint Comment 1  -- Mild incontinence (P)   -patient     Brief  Description of Current Complaint -- Not fulky emptying and leaking some (P)   -patient     Patient/Caregiver Goals -- Know what to do to help the symptoms (P)   -patient     Hand Dominance -- right-handed (P)   -patient     Occupation/sports/leisure activities -- Yoga, walking, dancing, gardening (P)   -patient     Patient seeing anyone else for problem(s)? -- No (P)   -patient     Are you or can you be pregnant -- No (P)   -patient        Fall Risk Assessment    Any falls in the past year: Yes  -RS Yes (P)   -patient     Number of falls reported in the last 12 months 1  -RS --     Factors that contributed to the fall: Slippery surface  -RS Slippery surface (P)   -patient        Services    Prior Rehab/Home Health Experiences Yes  -RS Yes (P)   -patient     Are you currently receiving Home Health services No  -RS No (P)   -patient     Do you plan to receive Home Health services in the near future No  -RS No (P)   -patient        Daily Activities    Primary Language English  -RS English (P)   -patient     Are you able to read Yes  -RS Yes (P)   -patient     Are you able to write Yes  -RS Yes (P)   -patient     How does patient learn best? Demonstration  -RS Demonstration (P)   -patient        Safety    Are you being hurt, hit, or frightened by anyone at home or in your life? No  -RS No (P)   -patient     Are you being neglected by a caregiver No  -RS No (P)   -patient     Have you had any of the following issues with N/A  -RS N/A (P)   -patient               User Key  (r) = Recorded By, (t) = Taken By, (c) = Cosigned By      Initials Name Provider Type    RS Stephanie Hightower, PT Physical Therapist    patient Inez Jollyn --                                 Pelvic Health       Row Name 08/06/24 1400             Subjective    Patient Reason for Visit Stress Incontinence  -RS      Brief Description of Chief Complaint The pt is a 55 yo female who presents with SANJEEV present chronically but gradually worsening the past few  months. She has always noticed difficulty with jumping and occasional SANJEEV with cough/ sneeze. The past few months, she has noticed difficulty with fully emptying her bladder and notices some leakage after standing. 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 She goes on walks and does yoga for exercise. She has decreased heavier exercise/activity due to R hip pain due to a small tear in one of her muscles, she was encouraged to undergo surgery but has not done through it as the pain seems to be improving. Her R hip pain is made wore with prolonged positioning (seated) and walking (more than a mile) reports mild discomfort with intercourse since going through menopause. She has given birth 3x, most recently 15 years ago, first vaginal birth and second and third . She denies pelvic pressure.  -RS      Patient Goals know what to do to help the symptoms  -RS      Patient Participated in POC and Goals Yes  -RS         Fall Risk Assessment    Any falls in the past year: Yes  -RS      Number of falls reported in the last 12 months 1  -RS      Factors that contributed to the fall: Slippery surface  -RS         Services    Prior Rehab/Home Health Experiences Yes  -RS      Are you currently receiving Home Health services No  -RS      Do you plan to receive Home Health services in the near future No  -RS         Daily Activities    Primary Language English  -RS      Are you able to read Yes  -RS      Are you able to write Yes  -RS      How does patient learn best? Demonstration  -RS         Safety    Are you being hurt, hit, or frightened by anyone at home or in your life? No  -RS      Are you being neglected by a caregiver No  -RS      Have you had any of the following issues with N/A  -RS         Urinary/Bowel History    Incomplete emptying yes  -RS      Stress incontinence yes  -RS      Post void dribble yes  -RS      Urgency yes  -RS      Nocturia (times per night)  0-1  -RS      Daytime frequency (hours) 15+ times per day  -RS         Pregnancy/Sexual History    Number of Pregnancies 3  -RS         Pelvic Floor Muscle    Patient/Parent/Guardian Consented to Internal Pelvic Floor Exam Yes  -RS      Strength (Right) 3: Squeeze with/without lift;4: Strong contraction  -RS      Strength (Left) 3: Squeeze with/without lift;4: Strong contraction  -RS      Symmetry of Sustained Maximal Contraction Symmetrical  -RS      Endurance (Ability to Hold Maximal Contraction) 10 sec  -RS      # of Reps of Maximal Contractions while Maintaining Endurance and Strength 4 sets  -RS      Fast Contraction (# of 1 sec contractions performed) 6  -RS         Observations    Perineal Observation Performed? Yes  -RS         Observation of Contraction in Perineum    Anal [place] Present  -RS      Perineal Body Lift Present  -RS         Pelvic Floor    Ability to Isolate Contraction of Pelvic Floor Yes  -RS         Cough    Abdominal Contraction Yes  -RS      Leak None  -RS      Contraction of Pelvic Floor Yes  -RS      Bulge No  -RS         Prolapse (Pop-Q)    Cystocele Stage I  -RS      Rectocele Stage 0  -RS                User Key  (r) = Recorded By, (t) = Taken By, (c) = Cosigned By      Initials Name Provider Type    RS Stephanie Hightower, PT Physical Therapist                    Therapy Education  Education Details: Role of  PF PT, POC, differential diagnosis, initial HEP, expectations, goals, anatomy, role of PF. Access Code [email or web address]  Given: HEP  Program: New  How Provided: Verbal, Demonstration, Written  Provided to: Patient  Level of Understanding: Verbalized, Demonstrated      PT OP Goals       Row Name 08/06/24 1400          PT Short Term Goals    STG Date to Achieve 09/20/24  -RS     STG 1 The pt will complete a bladder log to facilitate improved pattern recognition and urge suppression training.  -RS     STG 1 Progress New  -RS     STG 2 The pt will report IND with use of the knak to facilitate  improved pressure management during cough/sneeze.  -RS     STG 2 Progress New  -RS        Long Term Goals    LTG Date to Achieve 11/04/24  -RS     LTG 1 The pt will report IND and compliant with HEP focused on IND condition management and return to PLOF.  -RS     LTG 1 Progress New  -RS     LTG 2 The pt will report at least 75% reduction in MCCLAIN with cough/laugh to indicate improved pressure management.  -RS     LTG 2 Progress New  -RS     LTG 3 The pt will report at least 75% reduction in post void urine loss to facilitate improved self care ADL performance.  -RS     LTG 3 Progress New  -RS     LTG 4 The pt will demonstrate B hip strength at least 4+/5 to facilitate improved functional strength.  -RS     LTG 4 Progress New  -RS     LTG 5 The pt will increases voiding intervals to at least 2-3 hours on average to facilitate improved participation in daily activities.  -RS     LTG 5 Progress New  -RS        Time Calculation    PT Goal Re-Cert Due Date 11/04/24  -RS               User Key  (r) = Recorded By, (t) = Taken By, (c) = Cosigned By      Initials Name Provider Type    RS Stephanie Hightower, PT Physical Therapist                     PT Assessment/Plan       Row Name 08/06/24 1400          PT Assessment    Functional Limitations Limitation in home management;Limitations in community activities;Performance in self-care ADL;Performance in work activities  -RS     Impairments Impaired muscle length;Impaired muscle power;Impaired muscle endurance;Range of motion;Posture;Peripheral nerve integrity;Pain;Muscle strength  -RS     Assessment Comments Inez Juarez is a 54 y.o. female referred to physical therapy for SANJEEV. She presents with a stable clinical presentation, along with no remarkable comorbidities and personal factors of chronicity of symptoms that may impact her progress in the plan of care. Pt presents today with decreased PFM strength and power, anterior vaginal wall laxity above the level of the hymen  . her  signs and symptoms are consistent with referring diagnosis. The previous impairments limit her ability to cough/laugh/sneeze without urine loss, fully empty her bladder when urinating, participate in daily activities without the frequent nee to urinate. The pt self scores 4% disability on the IIQ SF (100=full disability).Pt will benefit from skilled PT to address the previous impairments and return to PLOF.  -RS     Please refer to paper survey for additional self-reported information No  -RS     Rehab Potential Good  -RS     Patient/caregiver participated in establishment of treatment plan and goals Yes  -RS     Patient would benefit from skilled therapy intervention Yes  -RS        PT Plan    PT Frequency 1x/week  -RS     Predicted Duration of Therapy Intervention (PT) 6-8 sessions  -RS     Planned CPT's? PT RE-EVAL: 11942;PT THER PROC EA 15 MIN: 81521;PT THER ACT EA 15 MIN: 39296;PT MANUAL THERAPY EA 15 MIN: 38688;PT NEUROMUSC RE-EDUCATION EA 15 MIN: 76122;PT GAIT TRAINING EA 15 MIN: 92524;PT SELF CARE/HOME MGMT/TRAIN EA 15: 39754;PT HOT OR COLD PACK TREAT MCARE;PT ELECTRICAL STIM UNATTEND: ;PT TRACTION LUMBAR: 76585;PT EVAL LOW COMPLEXITY: 79203  -RS     PT Plan Comments Assess R hip mobility and strength next session, review bladder log, discuss urge suppression and appropriate fluid intake, how is double voiding going  -RS               User Key  (r) = Recorded By, (t) = Taken By, (c) = Cosigned By      Initials Name Provider Type    RS Stephanie Hightower PT Physical Therapist                       OP Exercises       Row Name 08/06/24 1400             Subjective    Patient Reason for Visit Stress Incontinence  -RS      Brief Description of Chief Complaint The pt is a 53 yo female who presents with SANJEEV present chronically but gradually worsening the past few months. She has always noticed difficulty with jumping and occasional SANJEEV with cough/ sneeze. The past few months, she has noticed difficulty with  fully emptying her bladder and notices some leakage after standing. 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 She goes on walks and does yoga for exercise. She has decreased heavier exercise/activity due to R hip pain due to a small tear in one of her muscles, she was encouraged to undergo surgery but has not done through it as the pain seems to be improving. Her R hip pain is made wore with prolonged positioning (seated) and walking (more than a mile) reports mild discomfort with intercourse since going through menopause. She has given birth 3x, most recently 15 years ago, first vaginal birth and second and third . She denies pelvic pressure.  -RS      Patient Goals know what to do to help the symptoms  -RS      Patient Participated in POC and Goals Yes  -RS         Total Minutes    81954 - PT Therapeutic Activity Minutes 10  -RS         Exercise 1    Exercise Name 1 short and long PFM contractions in seated  -RS         Exercise 2    Exercise Name 2 review of bladder log instructions  -RS         Exercise 3    Exercise Name 3 double voiding  -RS                User Key  (r) = Recorded By, (t) = Taken By, (c) = Cosigned By      Initials Name Provider Type    RS Stephanie Hightower, PT Physical Therapist                                            Time Calculation:     Start Time: 1400  Stop Time: 1440  Time Calculation (min): 40 min  Timed Charges  19361 - PT Therapeutic Activity Minutes: 10  Untimed Charges  PT Eval/Re-eval Minutes: 29  Total Minutes  Timed Charges Total Minutes: 10  Untimed Charges Total Minutes: 29   Total Minutes: 29     Therapy Charges for Today       Code Description Service Date Service Provider Modifiers Qty    39108673528  PT THERAPEUTIC ACT EA 15 MIN 2024 Stephanie Hightower, PT GP 1    [id number] HC PT EVAL LOW COMPLEXITY 2 2024 Stephanie Hightower, PT GP 1                      Stephanie Hightower PT  2024

## 2024-08-16 ENCOUNTER — HOSPITAL ENCOUNTER (OUTPATIENT)
Dept: PHYSICAL THERAPY | Facility: HOSPITAL | Age: 54
Setting detail: THERAPIES SERIES
Discharge: HOME | End: 2024-08-16
Payer: COMMERCIAL

## 2024-08-16 DIAGNOSIS — N81.89 PELVIC FLOOR WEAKNESS: ICD-10-CM

## 2024-08-16 DIAGNOSIS — N39.3 STRESS INCONTINENCE: Primary | ICD-10-CM

## 2024-08-16 DIAGNOSIS — M62.89 PELVIC FLOOR DYSFUNCTION: ICD-10-CM

## 2024-08-16 DIAGNOSIS — R35.0 URINARY FREQUENCY: ICD-10-CM

## 2024-08-16 PROCEDURE — 97110 THERAPEUTIC EXERCISES: CPT

## 2024-08-16 PROCEDURE — 97530 THERAPEUTIC ACTIVITIES: CPT

## 2024-08-16 NOTE — THERAPY TREATMENT NOTE
Outpatient Physical Therapy Ortho Treatment Note  [facility]     Patient Name: Inez HERNANDEZ Larry  : 1970  MRN: [id number]  Today's Date: 2024      Visit Date: 2024    Visit Dx:    ICD-10-CM ICD-9-CM   1. Stress incontinence  N39.3 [id number]   2. Pelvic floor dysfunction  M62.89 618.83   3. Pelvic floor weakness  N81.89 618.89   4. Urinary frequency  R35.0 788.41       Patient Active Problem List   Diagnosis    Dysuria    Acute cystitis without hematuria    Iron deficiency anemia    Family history of polyps in the colon    Celiac disease    FH: colon polyps        Past Medical History:   Diagnosis Date    Anemia     Asthma     ALLERGY RELATED    Celiac disease     PONV (postoperative nausea and vomiting)     Urinary tract infection     Not sure        Past Surgical History:   Procedure Laterality Date     SECTION      X2    COLONOSCOPY N/A 2019    Procedure: COLONOSCOPY;  Surgeon: Sindhu Avilez MD;  Location: [facility] ENDOSCOPY;  Service: Gastroenterology    COLONOSCOPY N/A 3/13/2024    Procedure: COLONOSCOPY TO CECUM WITH COLD SNARE POLYPECTOMIES;  Surgeon: Sheridan Sullivan MD;  Location: [facility] ENDOSCOPY;  Service: Gastroenterology;  Laterality: N/A;  PRE: family hx of polyps  POST: hemorrhoids, polyps    CORRECTION HAMMER TOE  2018    CORRECTION HAMMER TOE      ENDOSCOPY N/A 2019    Procedure: ESOPHAGOGASTRODUODENOSCOPY;  Surgeon: Sindhu Avilez MD;  Location: [facility] ENDOSCOPY;  Service: Gastroenterology    TUBAL ABDOMINAL LIGATION  2016    TUBAL COAGULATION LAPAROSCOPIC N/A 3/31/2016    Procedure: TUBAL COAGULATION LAPAROSCOPIC;  Surgeon: En Caballero MD;  Location: [facility] OR;  Service:     UPPER GASTROINTESTINAL ENDOSCOPY  2019                    Pelvic Health       Row Name 24 1600             MMT (Manual Muscle Testing)    Rt Lower Ext Rt Hip Flexion;Rt Hip Extension;Rt Hip ABduction;Rt Hip Internal (Medial) Rotation;Rt Hip External (Lateral)  Rotation  -RS      Lt Lower Ext Lt Hip Flexion;Lt Hip Extension;Lt Hip ABduction;Lt Hip Internal (Medial) Rotation;Lt Hip External (Lateral) Rotation  -RS         MMT Right Lower Ext    Rt Hip Flexion MMT, Gross Movement (4+/5) good plus  -RS      Rt Hip Extension MMT, Gross Movement (4/5) good  -RS      Rt Hip ABduction MMT, Gross Movement (4-/5) good minus  -RS      Rt Hip Internal (Medial) Rotation MMT, Gross Movement (4-/5) good minus  -RS      Rt Hip External (Lateral) Rotation MMT, Gross Movement (4/5) good  -RS         MMT Left Lower Ext    Lt Hip Flexion MMT, Gross Movement (4+/5) good plus  -RS      Lt Hip Extension MMT, Gross Movement (4+/5) good plus  -RS      Lt Hip ABduction MMT, Gross Movement (4/5) good  -RS      Lt Hip Internal (Medial) Rotation MMT, Gross Movement (4/5) good  -RS      Lt Hip External (Lateral) Rotation MMT, Gross Movement (4+/5) good plus  -RS                User Key  (r) = Recorded By, (t) = Taken By, (c) = Cosigned By      Initials Name Provider Type    Stephanie Schulte PT Physical Therapist                     PT Assessment/Plan       Row Name 08/16/24 1600          PT Assessment    Assessment Comments Pt returns for first follow up session after initial eval reporting slight improvement in urinary frequency and fair to good compliance with HEP. Verbally reviewed bladder log/patterns and discussed urge suppression techniques and appropriate voiding intervals. Initiated pelvic floor and hip strengthening challenges including bridging, BKFO, qped hip extension, and STS all with good tolerance. Noted decreased R hip strength compared to L however no significant increase in pain reported. Updated HEP and provided copy to pt who reports understanding and remains appropriate for skilled PT.  -RS               User Key  (r) = Recorded By, (t) = Taken By, (c) = Cosigned By      Initials Name Provider Type    Stephanie Schulte PT Physical Therapist                       OP  Exercises       Row Name 08/16/24 1500             Subjective    Subjective Comments pt reports she has been trying to hold it more when she has to go and it has helped, she also feels like she is better able to empty her bladder than before  -RS         Total Minutes    16092 - PT Therapeutic Exercise Minutes 25  -RS      63361 - PT Therapeutic Activity Minutes 15  -RS         Exercise 1    Exercise Name 1 pelvic elevator  -RS      Cueing 1 Verbal;Demo  -RS      Reps 1 10  -RS      Time 1 2 floors  -RS         Exercise 2    Exercise Name 2 review of bladder log verbally and urge suppression techniques  -RS         Exercise 3    Exercise Name 3 PFM with AD  -RS      Cueing 3 Verbal;Demo  -RS      Reps 3 10  -RS      Time 3 5  -RS      Additional Comments ball  -RS         Exercise 4    Exercise Name 4 PFM with bridge  -RS      Cueing 4 Verbal;Demo  -RS      Sets 4 2  -RS      Reps 4 10  -RS      Time 4 3  -RS         Exercise 5    Exercise Name 5 BKFO  -RS      Cueing 5 Verbal;Demo  -RS      Sets 5 2  -RS      Reps 5 10  -RS      Time 5 GTB  -RS      Additional Comments with PFM  -RS         Exercise 6    Exercise Name 6 PFM with TA and SLR  -RS      Cueing 6 Verbal;Demo  -RS      Reps 6 10ea  -RS         Exercise 7    Exercise Name 7 qped hip ext with PFM  -RS      Cueing 7 Verbal;Demo  -RS      Sets 7 2  -RS      Reps 7 10ea  -RS         Exercise 8    Exercise Name 8 STS with PFM  -RS      Cueing 8 Verbal;Demo  -RS      Reps 8 10  -RS                User Key  (r) = Recorded By, (t) = Taken By, (c) = Cosigned By      Initials Name Provider Type    Stephanie Schulte, PT Physical Therapist                                  PT OP Goals       Row Name 08/16/24 1600          PT Short Term Goals    STG Date to Achieve 09/20/24  -RS     STG 1 The pt will complete a bladder log to facilitate improved pattern recognition and urge suppression training.  -RS     STG 1 Progress Ongoing  -RS     STG 2 The pt will report IND  with use of the knak to facilitate improved pressure management during cough/sneeze.  -RS     STG 2 Progress Ongoing  -RS        Long Term Goals    LTG Date to Achieve 11/04/24  -RS     LTG 1 The pt will report IND and compliant with HEP focused on IND condition management and return to PLOF.  -RS     LTG 1 Progress Ongoing  -RS     LTG 2 The pt will report at least 75% reduction in MCCLAIN with cough/laugh to indicate improved pressure management.  -RS     LTG 2 Progress Ongoing  -RS     LTG 3 The pt will report at least 75% reduction in post void urine loss to facilitate improved self care ADL performance.  -RS     LTG 3 Progress Ongoing  -RS     LTG 4 The pt will demonstrate B hip strength at least 4+/5 to facilitate improved functional strength.  -RS     LTG 4 Progress Ongoing  -RS     LTG 5 The pt will increases voiding intervals to at least 2-3 hours on average to facilitate improved participation in daily activities.  -RS     LTG 5 Progress Ongoing  -RS               User Key  (r) = Recorded By, (t) = Taken By, (c) = Cosigned By      Initials Name Provider Type    RS Stephanie Hightower PT Physical Therapist                    Therapy Education  Given: HEP  Program: Reinforced, Progressed  How Provided: Verbal, Demonstration, Written  Provided to: Patient  Level of Understanding: Verbalized, Demonstrated              Time Calculation:   Start Time: 1530  Stop Time: 1615  Time Calculation (min): 45 min  Timed Charges  27585 - PT Therapeutic Exercise Minutes: 25  07774 - PT Therapeutic Activity Minutes: 15  Total Minutes  Timed Charges Total Minutes: 40   Total Minutes: 40  Therapy Charges for Today       Code Description Service Date Service Provider Modifiers Qty    07001893811  PT THER PROC EA 15 MIN 8/16/2024 Stephanie Hightower PT GP 2    [id number]  PT THERAPEUTIC ACT EA 15 MIN 8/16/2024 Stephanie Hightower PT GP 1                      Stephanie Hightower PT  8/16/2024

## 2024-11-14 ENCOUNTER — TELEPHONE (OUTPATIENT)
Dept: URGENT CARE | Facility: CLINIC | Age: 54
End: 2024-11-14
Payer: COMMERCIAL

## 2025-01-29 ENCOUNTER — TELEPHONE (OUTPATIENT)
Dept: OBSTETRICS AND GYNECOLOGY | Facility: CLINIC | Age: 55
End: 2025-01-29
Payer: COMMERCIAL

## 2025-01-29 RX ORDER — VALACYCLOVIR HYDROCHLORIDE 1 G/1
1000 TABLET, FILM COATED ORAL DAILY
Qty: 30 TABLET | Refills: 12 | Status: SHIPPED | OUTPATIENT
Start: 2025-01-29

## 2025-01-29 NOTE — TELEPHONE ENCOUNTER
Pt last seen for annual on 7/10/24. Pt is requesting a refill for valACYclovir (VALTREX) 1000 MG tablet.

## 2025-07-24 ENCOUNTER — TELEPHONE (OUTPATIENT)
Dept: FAMILY MEDICINE CLINIC | Facility: CLINIC | Age: 55
End: 2025-07-24

## 2025-07-24 ENCOUNTER — TELEMEDICINE (OUTPATIENT)
Dept: FAMILY MEDICINE CLINIC | Facility: CLINIC | Age: 55
End: 2025-07-24
Payer: COMMERCIAL

## 2025-07-24 DIAGNOSIS — U07.1 COVID: Primary | ICD-10-CM

## 2025-07-24 PROCEDURE — 99212 OFFICE O/P EST SF 10 MIN: CPT | Performed by: NURSE PRACTITIONER

## 2025-07-24 NOTE — PROGRESS NOTES
Subjective   Inez Juarez is a 55 y.o. female.     History of Present Illness   Chief Complaint     Exposure To Known Illness (Covid positive)- patient presents for video visit for evaluation and treatment of COVID.  She reports onset of symptoms overnight and had positive covid test yesterday.  Reports body aches, chills, sore throat.  Had low grade temp.  Has a little bit of cough with clear drainage.  Denies shortness of breath or chest pain.  Has been taking Tylenol.       The following portions of the patient's history were reviewed and updated as appropriate: allergies, current medications, past family history, past medical history, past social history, past surgical history, and problem list.    Review of Systems   Constitutional:  Positive for chills, fatigue and fever.   HENT:  Positive for congestion and postnasal drip.    Respiratory:  Positive for cough. Negative for chest tightness, shortness of breath and wheezing.    Musculoskeletal:  Positive for myalgias.       Objective   Physical Exam  Vitals and nursing note reviewed.   Constitutional:       Appearance: She is well-developed.   Pulmonary:      Effort: Pulmonary effort is normal.   Neurological:      Mental Status: She is alert and oriented to person, place, and time.   Psychiatric:         Mood and Affect: Mood normal.         Behavior: Behavior normal.         Thought Content: Thought content normal.         Judgment: Judgment normal.         Assessment & Plan   Diagnoses and all orders for this visit:    1. COVID (Primary)             She will restart vit D, vit C and zinc.  Rest and adequate hydration recommended.  She will let me know if symptoms worsen over the weekend as I am on call.

## 2025-07-24 NOTE — TELEPHONE ENCOUNTER
Caller: Inez Juarez    Relationship to patient: Self    Best call back number: [phone]      Date of positive COVID19 test: 7/223/25    COVID19 symptoms: BODY ACHES, LOW GRADE FEVER, DRAINAGE, SORE THROAT, HEADACHE, FATIGUE    Date of initial quarantine: 7/24/25    Additional information or concerns:     What is the patients preferred pharmacy: [facility] & [address] - [phone] [facility] [phone] FX

## 2025-07-28 NOTE — PROGRESS NOTES
GYN ANNUAL EXAM   Chief Complaint   Patient presents with    Annual Exam     AE and last pap ,MX today       JAYDEN Wiley is a 55 y.o. female who presents for annual well woman exam. She is a patient of Dr. Caballero.     OB History          3    Para   3    Term   3            AB        Living             SAB        IAB        Ectopic        Molar        Multiple        Live Births                    SUBJECTIVE    MENSTRUAL & SEXUAL HEALTH  LMP: Patient's last menstrual period was 2018.  Menses regularity: n/a  Menses length:  n/a  Dysmenorrhea: none  Cyclic symptoms: none  Current contraception: post menopausal status  Last pap: , Normal PAP  History of abnormal pap: no  History of STD: No  Family history of cancer: uterine cancer - mother       Family history of breast cancer: no  Performs SBE: performs monthly.  Mammogram: , Birads II (Benign findings).  History of abnormal mammogram: no  Colonoscopy:   Bleeding since LMP: no  Vasomotor symptoms: yes  HRT: no  Incontinence: Patient reports that she occasionally experiences symptoms of urinary incontinence.  She did try pelvic floor physical therapy but has gotten involved with doing physical therapy for her hip most recently.  Dyspareunia: yes -related to vaginal dryness    Past Medical History:   Diagnosis Date    Anemia 2019    Asthma 2000    ALLERGY RELATED    Celiac disease 2019    Herpes 2020    PMS (premenstrual syndrome)     PONV (postoperative nausea and vomiting)     Rh incompatibility     I dont remember    Urinary tract infection     Not sure    Varicella     Child       Past Surgical History:   Procedure Laterality Date     SECTION      X2    COLONOSCOPY N/A 2019    Procedure: COLONOSCOPY;  Surgeon: Sindhu Avilez MD;  Location: [facility] ENDOSCOPY;  Service: Gastroenterology    COLONOSCOPY N/A 2024    Procedure: COLONOSCOPY TO CECUM WITH COLD SNARE POLYPECTOMIES;  Surgeon: Laurie  Sheridan ANDUJAR MD;  Location[facility] ENDOSCOPY;  Service: Gastroenterology;  Laterality: N/A;  PRE: family hx of polyps  POST: hemorrhoids, polyps    CORRECTION HAMMER TOE  06/29/2018    CORRECTION HAMMER TOE      ENDOSCOPY N/A 01/21/2019    Procedure: ESOPHAGOGASTRODUODENOSCOPY;  Surgeon: Sindhu Avilez MD;  Location:  [facility];  Service: Gastroenterology    TUBAL ABDOMINAL LIGATION  03/31/2016    TUBAL COAGULATION LAPAROSCOPIC N/A 03/31/2016    Procedure: TUBAL COAGULATION LAPAROSCOPIC;  Surgeon: En Caballero MD;  Location: [facility] MAIN OR;  Service:     UPPER GASTROINTESTINAL ENDOSCOPY  01/21/2019    WISDOM TOOTH EXTRACTION      Dont remember the year         Current Outpatient Medications:     Ascorbic Acid (VITAMIN C PO), Take  by mouth., Disp: , Rfl:     BIOTIN PO, Take  by mouth., Disp: , Rfl:     ibuprofen (ADVIL,MOTRIN) 200 MG tablet, Take 1 tablet by mouth Every 6 (Six) Hours As Needed for Mild Pain., Disp: , Rfl:     IRON-VITAMINS PO, Take 1 tablet by mouth Daily., Disp: , Rfl:     Probiotic Product (PROBIOTIC PO), Take  by mouth., Disp: , Rfl:     TURMERIC PO, Take  by mouth., Disp: , Rfl:     valACYclovir (VALTREX) 1000 MG tablet, Take 1 tablet by mouth Daily., Disp: 90 tablet, Rfl: 3    valACYclovir (Valtrex) 1000 MG tablet, Take 1 tablet by mouth Daily., Disp: 30 tablet, Rfl: 12    VITAMIN D PO, Take  by mouth., Disp: , Rfl:     estradiol (ESTRACE VAGINAL) 0.1 MG/GM vaginal cream, Insert 2 g into the vagina Every Night. Insert 2 grams into the vagina and place a pea sized amount of cream at the introitus nightly for 14 nights. After that, use 2 to 3 times per week for maintenance., Disp: 42.5 g, Rfl: 5    No Known Allergies    Social History     Tobacco Use    Smoking status: Never    Smokeless tobacco: Never   Vaping Use    Vaping status: Never Used   Substance Use Topics    Alcohol use: Yes     Alcohol/week: 1.0 standard drink of alcohol     Types: 1 Glasses of wine per week     Comment: occ     "Drug use: No       Family History   Problem Relation Age of Onset    Uterine cancer Mother     Heart attack Mother     Hypertension Mother     Depression Mother     Arthritis Mother     Cataracts Mother     Colon polyps Mother     Alcohol abuse Father     COPD Father         smoker    Depression Sister     Lung disease Maternal Grandmother     Stroke Paternal Grandmother     Malig Hyperthermia Neg Hx        Review of Systems   Constitutional:  Negative for fatigue, unexpected weight gain and unexpected weight loss.   Gastrointestinal:  Negative for abdominal pain.   Genitourinary:  Positive for dyspareunia. Negative for decreased libido, difficulty urinating, dysuria, pelvic pain, pelvic pressure, urgency, urinary incontinence and vaginal discharge.   All other systems reviewed and are negative.      OBJECTIVE    BP 99/65   Ht 162.6 cm (64.02\")   Wt 70.3 kg (155 lb)   LMP 12/28/2018   BMI 26.59 kg/m²     Physical Exam  Constitutional:       General: She is awake. She is not in acute distress.     Appearance: Normal appearance. She is well-developed and well-groomed. She is not ill-appearing.   Genitourinary:      Vulva, bladder and urethral meatus normal.      Right Labia: No rash, tenderness, lesions or skin changes.     Left Labia: No tenderness, lesions, skin changes or rash.     No labial fusion noted.      No inguinal adenopathy present in the right or left side.     No vaginal discharge, erythema, tenderness, bleeding, ulceration or granulation tissue.      No vaginal prolapse present.     No vaginal atrophy present.     No cervical discharge, friability, lesion, polyp, eversion or elongation.      Uterus is not enlarged, tender or prolapsed.      Pelvic exam was performed with patient in the lithotomy position.   Breasts:     Breasts are symmetrical.      Breasts are soft.     Right: Normal.      Left: Normal.   HENT:      Head: Normocephalic and atraumatic.   Eyes:      General: No scleral icterus.     " Conjunctiva/sclera: Conjunctivae normal.   Cardiovascular:      Rate and Rhythm: Normal rate and regular rhythm.      Heart sounds: Normal heart sounds.   Pulmonary:      Effort: Pulmonary effort is normal.      Breath sounds: Normal breath sounds.   Abdominal:      Palpations: Abdomen is soft.      Hernia: There is no hernia in the left inguinal area or right inguinal area.   Musculoskeletal:         General: Normal range of motion.      Cervical back: Normal range of motion.   Lymphadenopathy:      Lower Body: No right inguinal adenopathy. No left inguinal adenopathy.   Neurological:      Mental Status: She is alert.   Skin:     General: Skin is warm and dry.      Coloration: Skin is not jaundiced or pale.   Psychiatric:         Behavior: Behavior normal. Behavior is cooperative.   Vitals reviewed.         ASSESSMENT    Diagnoses and all orders for this visit:    1. Encounter for gynecological examination without abnormal finding (Primary)  -     IGP, Apt HPV,rfx 16 / 18,45    2. Family history of uterine cancer    3. Vaginal dryness  -     estradiol (ESTRACE VAGINAL) 0.1 MG/GM vaginal cream; Insert 2 g into the vagina Every Night. Insert 2 grams into the vagina and place a pea sized amount of cream at the introitus nightly for 14 nights. After that, use 2 to 3 times per week for maintenance.  Dispense: 42.5 g; Refill: 5         PLAN   WELL WOMAN EXAM: Pap smear collected today. Recommend MVI daily.    SMOKING STATUS: non smoker.  FAMILY HISTORY OF UTERINE CANCER: Education provided via AVS on the ovarian cancer screening program at [facility]. Strongly encouraged patient to participate.   BONE HEALTH: weight bearing exercise, dietary calcium recommendations and vitamin D reviewed.  COLON HEALTH: screening colonoscopy or Cologuard recommended.  BREAST HEALTH: Encouraged annual mammogram screening starting at age 40. Instructed on how to perform SBE. Encouraged breast health self awareness.  EXERCISE:  Encouraged 150 minutes of exercise per week if not medially contraindicated.   BMI: Body mass index is 26.59 kg/m².     Return in about 1 year (around 7/29/2026) for annual exam or as needed before.    I spent 30 minutes caring for Inez on this date of service. This time includes time spent by me in the following activities: preparing for the visit, reviewing tests, performing a medically appropriate examination and/or evaluation, counseling and educating the patient/family/caregiver, referring and communicating with other health care professionals, documenting information in the medical record, independently interpreting results and communicating that information with the patient/family/caregiver, care coordination, ordering medications, ordering test(s), ordering procedure(s), obtaining a separately obtained history, and reviewing a separately obtained history.    Jen Merritt CNM  7/29/2025  13:29 EDT

## 2025-07-29 ENCOUNTER — OFFICE VISIT (OUTPATIENT)
Dept: OBSTETRICS AND GYNECOLOGY | Facility: CLINIC | Age: 55
End: 2025-07-29
Payer: COMMERCIAL

## 2025-07-29 ENCOUNTER — PROCEDURE VISIT (OUTPATIENT)
Dept: OBSTETRICS AND GYNECOLOGY | Facility: CLINIC | Age: 55
End: 2025-07-29
Payer: COMMERCIAL

## 2025-07-29 VITALS
BODY MASS INDEX: 26.46 KG/M2 | SYSTOLIC BLOOD PRESSURE: 99 MMHG | WEIGHT: 155 LBS | DIASTOLIC BLOOD PRESSURE: 65 MMHG | HEIGHT: 64 IN

## 2025-07-29 DIAGNOSIS — Z01.419 ENCOUNTER FOR GYNECOLOGICAL EXAMINATION WITHOUT ABNORMAL FINDING: Primary | ICD-10-CM

## 2025-07-29 DIAGNOSIS — Z80.49 FAMILY HISTORY OF UTERINE CANCER: ICD-10-CM

## 2025-07-29 DIAGNOSIS — N89.8 VAGINAL DRYNESS: ICD-10-CM

## 2025-07-29 DIAGNOSIS — Z12.31 VISIT FOR SCREENING MAMMOGRAM: Primary | ICD-10-CM

## 2025-07-29 PROCEDURE — 77067 SCR MAMMO BI INCL CAD: CPT

## 2025-07-29 PROCEDURE — 77063 BREAST TOMOSYNTHESIS BI: CPT

## 2025-07-29 RX ORDER — ESTRADIOL 0.1 MG/G
2 CREAM VAGINAL NIGHTLY
Qty: 42.5 G | Refills: 5 | Status: SHIPPED | OUTPATIENT
Start: 2025-07-29

## 2025-07-29 NOTE — PATIENT INSTRUCTIONS
[facility] OVARIAN CANCER SCREENING PROGRAM    The Ovarian Cancer Screening Program at [facility] is a clinical research study. The [facility] Ovarian Cancer Screening Program provides free annual sonographic screenings to women across [place] with the goal of detecting cancer early. When it’s caught early, ovarian cancer is a treatable and curable disease.      SCREENING ELIGIBILITY    All women over the age of 50 (including those who have no symptoms and no personal history of ovarian cancer) are eligible for a free ovarian cancer screening.  Women over the age of 25 who have a family history of ovarian cancer are also eligible for a free screening.  Any woman in one of these two groups should contact us at [phone] to schedule an appointment.      HOURS AND CONTACT INFORMATION    Hours of Operation:  Monday - Friday: 8 a.m. - 4:30 p.m.  To schedule an appointment, please consider one of the following points of contact:    Call:   [phone]  [phone]    Please plan on scheduling your screening appointments two or more months in advance.

## 2025-07-31 LAB
CYTOLOGIST CVX/VAG CYTO: NORMAL
CYTOLOGY CVX/VAG DOC CYTO: NORMAL
CYTOLOGY CVX/VAG DOC THIN PREP: NORMAL
DX ICD CODE: NORMAL
HPV I/H RISK 4 DNA CVX QL PROBE+SIG AMP: NEGATIVE
OTHER STN SPEC: NORMAL
SERVICE CMNT-IMP: NORMAL
STAT OF ADQ CVX/VAG CYTO-IMP: NORMAL

## (undated) DEVICE — CANN NASL CO2 TRULINK W/O2 A/

## (undated) DEVICE — Device: Brand: DEFENDO AIR/WATER/SUCTION AND BIOPSY VALVE

## (undated) DEVICE — THE TORRENT IRRIGATION SCOPE CONNECTOR IS USED WITH THE TORRENT IRRIGATION TUBING TO PROVIDE IRRIGATION FLUIDS SUCH AS STERILE WATER DURING GASTROINTESTINAL ENDOSCOPIC PROCEDURES WHEN USED IN CONJUNCTION WITH AN IRRIGATION PUMP (OR ELECTROSURGICAL UNIT).: Brand: TORRENT

## (undated) DEVICE — ADAPT CLN BIOGUARD AIR/H2O DISP

## (undated) DEVICE — CANN O2 ETCO2 FITS ALL CONN CO2 SMPL A/ 7IN DISP LF

## (undated) DEVICE — TUBING, SUCTION, 1/4" X 10', STRAIGHT: Brand: MEDLINE

## (undated) DEVICE — SENSR O2 OXIMAX FNGR A/ 18IN NONSTR

## (undated) DEVICE — FRCP BX RADJAW4 NDL 2.8 240CM LG OG BX40

## (undated) DEVICE — SNAR POLYP SENSATION STDOVL 27 240 BX40

## (undated) DEVICE — KT ORCA ORCAPOD DISP STRL

## (undated) DEVICE — SINGLE-USE POLYPECTOMY SNARE: Brand: CAPTIVATOR II

## (undated) DEVICE — BITEBLOCK OMNI BLOC

## (undated) DEVICE — LN SMPL CO2 SHTRM SD STREAM W/M LUER

## (undated) DEVICE — THE SINGLE USE ETRAP – POLYP TRAP IS USED FOR SUCTION RETRIEVAL OF ENDOSCOPICALLY REMOVED POLYPS.: Brand: ETRAP